# Patient Record
Sex: FEMALE | Race: WHITE | NOT HISPANIC OR LATINO | Employment: UNEMPLOYED | URBAN - METROPOLITAN AREA
[De-identification: names, ages, dates, MRNs, and addresses within clinical notes are randomized per-mention and may not be internally consistent; named-entity substitution may affect disease eponyms.]

---

## 2023-11-17 ENCOUNTER — TELEPHONE (OUTPATIENT)
Dept: CARDIOLOGY | Facility: CLINIC | Age: 78
End: 2023-11-17
Payer: MEDICARE

## 2023-11-17 NOTE — TELEPHONE ENCOUNTER
Pt feels tights in her chest and bp is running 150/80 this morning. Pt keeps saying she is not well and needs her appt moved up with Dr Gomez . She has a appt on Nov 28

## 2023-11-19 ENCOUNTER — HOSPITAL ENCOUNTER (EMERGENCY)
Facility: HOSPITAL | Age: 78
Discharge: HOME | End: 2023-11-20
Payer: MEDICARE

## 2023-11-19 VITALS
HEIGHT: 62 IN | WEIGHT: 150 LBS | BODY MASS INDEX: 27.6 KG/M2 | DIASTOLIC BLOOD PRESSURE: 113 MMHG | SYSTOLIC BLOOD PRESSURE: 199 MMHG | RESPIRATION RATE: 20 BRPM | TEMPERATURE: 97.3 F | HEART RATE: 100 BPM | OXYGEN SATURATION: 96 %

## 2023-11-19 PROCEDURE — 4500999001 HC ED NO CHARGE

## 2023-11-19 PROCEDURE — 99281 EMR DPT VST MAYX REQ PHY/QHP: CPT

## 2023-11-19 ASSESSMENT — COLUMBIA-SUICIDE SEVERITY RATING SCALE - C-SSRS
1. IN THE PAST MONTH, HAVE YOU WISHED YOU WERE DEAD OR WISHED YOU COULD GO TO SLEEP AND NOT WAKE UP?: NO
2. HAVE YOU ACTUALLY HAD ANY THOUGHTS OF KILLING YOURSELF?: NO
6. HAVE YOU EVER DONE ANYTHING, STARTED TO DO ANYTHING, OR PREPARED TO DO ANYTHING TO END YOUR LIFE?: NO

## 2023-11-19 ASSESSMENT — PAIN SCALES - GENERAL: PAINLEVEL_OUTOF10: 7

## 2023-11-19 ASSESSMENT — PAIN - FUNCTIONAL ASSESSMENT: PAIN_FUNCTIONAL_ASSESSMENT: 0-10

## 2023-11-20 ENCOUNTER — OFFICE VISIT (OUTPATIENT)
Dept: CARDIOLOGY | Facility: CLINIC | Age: 78
End: 2023-11-20
Payer: MEDICARE

## 2023-11-20 VITALS
OXYGEN SATURATION: 97 % | HEART RATE: 88 BPM | WEIGHT: 151 LBS | SYSTOLIC BLOOD PRESSURE: 142 MMHG | DIASTOLIC BLOOD PRESSURE: 84 MMHG | HEIGHT: 62 IN | BODY MASS INDEX: 27.79 KG/M2

## 2023-11-20 DIAGNOSIS — E78.49 OTHER HYPERLIPIDEMIA: ICD-10-CM

## 2023-11-20 DIAGNOSIS — I10 PRIMARY HYPERTENSION: Primary | ICD-10-CM

## 2023-11-20 DIAGNOSIS — R07.89 CHEST TIGHTNESS: ICD-10-CM

## 2023-11-20 DIAGNOSIS — I25.118 CORONARY ARTERY DISEASE OF NATIVE ARTERY OF NATIVE HEART WITH STABLE ANGINA PECTORIS (CMS-HCC): ICD-10-CM

## 2023-11-20 PROCEDURE — 3079F DIAST BP 80-89 MM HG: CPT | Performed by: INTERNAL MEDICINE

## 2023-11-20 PROCEDURE — 1125F AMNT PAIN NOTED PAIN PRSNT: CPT | Performed by: INTERNAL MEDICINE

## 2023-11-20 PROCEDURE — 99214 OFFICE O/P EST MOD 30 MIN: CPT | Performed by: INTERNAL MEDICINE

## 2023-11-20 PROCEDURE — 1159F MED LIST DOCD IN RCRD: CPT | Performed by: INTERNAL MEDICINE

## 2023-11-20 PROCEDURE — 3077F SYST BP >= 140 MM HG: CPT | Performed by: INTERNAL MEDICINE

## 2023-11-20 RX ORDER — LOSARTAN POTASSIUM 25 MG/1
25 TABLET ORAL
COMMUNITY
Start: 2023-09-12 | End: 2024-04-15 | Stop reason: SDUPTHER

## 2023-11-20 RX ORDER — METOPROLOL SUCCINATE 25 MG/1
25 TABLET, EXTENDED RELEASE ORAL DAILY
Qty: 30 TABLET | Refills: 11 | Status: SHIPPED | OUTPATIENT
Start: 2023-11-20 | End: 2024-01-24 | Stop reason: SDUPTHER

## 2023-11-20 RX ORDER — REGADENOSON 0.08 MG/ML
0.4 INJECTION, SOLUTION INTRAVENOUS
Status: CANCELLED | OUTPATIENT
Start: 2023-11-20

## 2023-11-20 RX ORDER — CALCIUM CITRATE/VITAMIN D3 315MG-6.25
1 TABLET ORAL
COMMUNITY
Start: 2022-12-13

## 2023-11-20 ASSESSMENT — ENCOUNTER SYMPTOMS
DYSPNEA ON EXERTION: 1
LIGHT-HEADEDNESS: 1
WEAKNESS: 1

## 2023-11-20 NOTE — PATIENT INSTRUCTIONS
Continue the losartan 25 mg daily    Start metoprolol Succinate 25 mg 1/2 tablet daily.    We are going to arrange for you to have a scan of your heart.    See Dr. Laurel Crowe.

## 2023-11-20 NOTE — PROGRESS NOTES
Subjective   Edelmira Montesinos is a 78 y.o. female.    Chief Complaint:  Chest tightness.  Follow-up hypertension.    HPI    She has developed chest tightness.  She describes this as a tight squeezing sensation in the midsternal area lasting for minutes to perhaps as long as 30 minutes.  Not associated with any other symptoms such as shortness of breath or diaphoresis.  Not necessarily related to activities.  However symptoms of chest tightness is new in comparison to prior evaluations.  She is checking her blood pressures very frequently at home.  She is concerned about elevated blood pressures and about low blood pressures.  She takes only losartan 25 mg daily.  She claims to have had multiple side effects to numerous antihypertensive medications that we have tried in the past.    Her diagnosis of coronary artery disease is based on findings of calcifications in the distribution of the coronary arteries seen on a prior CT scan of the chest.     Her past cardiac history is unremarkable. There is no history of diabetes. There is no smoking history. No family history of premature coronary artery disease.     She has had a number of side effects to multiple medications.     She has been seen by the neurology service. It was felt that she may have subtle changes of Parkinson's.     Other medical problems include a history of varicose veins and degenerative arthritis.    Allergies  Medication    · bisoprolol   Recorded By: Sera Crawford; 6/15/2020 8:49:56 AM   · metoprolol   Recorded By: Sera Crawford; 6/15/2020 8:49:56 AM   · spironolactone   Recorded By: Sera Crawford; 2020 12:07:52 PM   · telmisartan   Recorded By: Sera Crawford; 8/10/2020 10:09:16 AM     Family History  Mother    · Family history of    · Family history of Old age  Father    · Family history of cerebral infarction (V17.1) (Z82.3)     Social History  Problems    · Daily caffeine consumption   · Never a smoker   · No alcohol  "use      Review of Systems   Constitutional: Positive for malaise/fatigue.   Cardiovascular:  Positive for chest pain and dyspnea on exertion.   Musculoskeletal:  Positive for arthritis.   Neurological:  Positive for light-headedness and weakness.       Visit Vitals  /84 (BP Location: Right arm, Patient Position: Sitting, BP Cuff Size: Adult)   Pulse 88   Ht 1.575 m (5' 2\")   Wt 68.5 kg (151 lb)   SpO2 97%   BMI 27.62 kg/m²   BSA 1.73 m²        Objective     Constitutional:       Appearance: Not in distress.   Neck:      Vascular: JVD normal.   Pulmonary:      Breath sounds: Normal breath sounds.   Cardiovascular:      Normal rate. Regular rhythm. Normal S1. Normal S2.       Murmurs: There is no murmur.      No gallop.    Pulses:     Intact distal pulses.   Edema:     Peripheral edema absent.   Abdominal:      General: There is no distension.      Palpations: Abdomen is soft.   Neurological:      Mental Status: Alert.         Lab Review:   Lab Results   Component Value Date     02/07/2021    K 4.0 02/07/2021     02/07/2021    CO2 26 02/07/2021    BUN 17 02/07/2021    CREATININE 0.76 02/07/2021    GLUCOSE 94 02/07/2021    CALCIUM 9.1 02/07/2021     Lab Results   Component Value Date    CHOL 214 (H) 12/29/2020    TRIG 140 12/29/2020    HDL 67.9 12/29/2020       Assessment:    1.  Coronary artery disease.  Now has developed symptoms consistent with angina.  Our plan is to proceed with a Lexiscan thallium study.    2.  Hypertension.  Not well controlled.  Slightly increased heart rates on today's visit.  We will start metoprolol ER 25 mg daily.  She has been on this in the past but we had a discussion with her this is a long-acting medication.  We have strongly encouraged her not to check her blood pressures at home as this causes her a great deal of anxiety.    3.  Hyperlipidemia.  Not on statin therapy because of perceived side effects to medications.    4.  Movement disorder.  Suspicion of early " Parkinson's has been raised.  We have asked her to make an appointment with the neurology service again.

## 2023-11-20 NOTE — ED TRIAGE NOTES
Chest pain. Heaviness on chest, tightness on chest.  Has had intermittently over the past 3 months.  Has appointment with dr pierce tomorrow for this.

## 2023-11-21 PROBLEM — M85.80 OSTEOPENIA: Status: ACTIVE | Noted: 2023-11-21

## 2023-11-21 PROBLEM — E87.1 HYPONATREMIA: Status: ACTIVE | Noted: 2023-11-21

## 2023-11-21 PROBLEM — I47.10 PSVT (PAROXYSMAL SUPRAVENTRICULAR TACHYCARDIA) (CMS-HCC): Status: ACTIVE | Noted: 2023-11-21

## 2023-11-21 PROBLEM — E04.1 THYROID NODULE: Status: ACTIVE | Noted: 2023-11-15

## 2023-11-21 PROBLEM — R51.9 FREQUENT HEADACHES: Status: ACTIVE | Noted: 2020-10-21

## 2023-11-21 PROBLEM — G20.C PARKINSONISM (MULTI): Status: ACTIVE | Noted: 2023-11-21

## 2023-11-21 PROBLEM — R05.3 COUGH, PERSISTENT: Status: ACTIVE | Noted: 2023-11-21

## 2023-11-21 PROBLEM — I10 HYPERTENSION: Status: ACTIVE | Noted: 2020-10-21

## 2023-11-21 PROBLEM — R07.0 THROAT DISCOMFORT: Status: ACTIVE | Noted: 2023-11-21

## 2023-11-21 PROBLEM — I83.11 VARICOSE VEINS OF BOTH LOWER EXTREMITIES WITH INFLAMMATION: Status: ACTIVE | Noted: 2017-08-12

## 2023-11-21 PROBLEM — N81.2 CYSTOCELE WITH INCOMPLETE UTEROVAGINAL PROLAPSE: Status: ACTIVE | Noted: 2023-11-21

## 2023-11-21 PROBLEM — K21.9 CHRONIC GERD: Status: ACTIVE | Noted: 2023-11-21

## 2023-11-21 PROBLEM — R00.2 PALPITATIONS: Status: ACTIVE | Noted: 2023-11-21

## 2023-11-21 PROBLEM — M17.9 OSTEOARTHRITIS OF KNEE: Status: ACTIVE | Noted: 2023-11-21

## 2023-11-21 PROBLEM — M79.669 LOWER LEG PAIN: Status: ACTIVE | Noted: 2023-11-21

## 2023-11-21 PROBLEM — T50.905A MEDICATION ADVERSE EFFECT: Status: ACTIVE | Noted: 2023-11-21

## 2023-11-21 PROBLEM — L30.9 DERMATITIS: Status: ACTIVE | Noted: 2023-11-21

## 2023-11-21 PROBLEM — E66.3 OVERWEIGHT: Status: ACTIVE | Noted: 2023-11-21

## 2023-11-21 PROBLEM — R92.8 ABNORMAL MAMMOGRAM: Status: ACTIVE | Noted: 2023-11-21

## 2023-11-21 PROBLEM — R10.2 PELVIC PAIN IN FEMALE: Status: ACTIVE | Noted: 2023-11-21

## 2023-11-21 PROBLEM — R82.90 ABNORMAL URINALYSIS: Status: ACTIVE | Noted: 2023-11-21

## 2023-11-21 PROBLEM — N95.0 POST-MENOPAUSAL BLEEDING: Status: ACTIVE | Noted: 2023-11-21

## 2023-11-21 PROBLEM — I83.93 VARICOSE VEINS OF BOTH LOWER EXTREMITIES: Status: ACTIVE | Noted: 2017-08-12

## 2023-11-21 PROBLEM — R12 HEARTBURN: Status: ACTIVE | Noted: 2023-11-21

## 2023-11-21 PROBLEM — J04.0 REFLUX LARYNGITIS: Status: ACTIVE | Noted: 2023-11-21

## 2023-11-21 PROBLEM — K11.7 DISTURBANCE OF SALIVARY SECRETION: Status: ACTIVE | Noted: 2023-11-21

## 2023-11-21 PROBLEM — R42 DIZZINESS: Status: ACTIVE | Noted: 2023-11-21

## 2023-11-21 PROBLEM — M79.2 NERVE PAIN: Status: ACTIVE | Noted: 2023-11-21

## 2023-11-21 PROBLEM — I99.8 VASCULAR INSUFFICIENCY: Status: ACTIVE | Noted: 2023-11-21

## 2023-11-21 PROBLEM — B35.1 OM (ONYCHOMYCOSIS): Status: ACTIVE | Noted: 2017-08-12

## 2023-11-21 PROBLEM — G57.30 PERONEAL NEUROPATHY: Status: ACTIVE | Noted: 2023-11-21

## 2023-11-21 PROBLEM — R09.82 POST-NASAL DRIP: Status: ACTIVE | Noted: 2023-11-21

## 2023-11-21 PROBLEM — E78.5 HYPERLIPIDEMIA: Status: ACTIVE | Noted: 2023-11-21

## 2023-11-21 PROBLEM — I49.3 FREQUENT PVCS: Status: ACTIVE | Noted: 2023-11-21

## 2023-11-21 PROBLEM — K21.9 REFLUX LARYNGITIS: Status: ACTIVE | Noted: 2023-11-21

## 2023-11-21 PROBLEM — R45.0 NERVOUSNESS: Status: ACTIVE | Noted: 2023-11-21

## 2023-11-21 PROBLEM — E04.2 MULTIPLE THYROID NODULES: Status: ACTIVE | Noted: 2021-07-29

## 2023-11-21 PROBLEM — I83.12 VARICOSE VEINS OF BOTH LOWER EXTREMITIES WITH INFLAMMATION: Status: ACTIVE | Noted: 2017-08-12

## 2023-11-21 PROBLEM — R94.31 ABNORMAL EKG: Status: ACTIVE | Noted: 2023-11-21

## 2023-11-21 PROBLEM — R49.0 HOARSENESS, PERSISTENT: Status: ACTIVE | Noted: 2023-11-21

## 2023-12-03 ENCOUNTER — OFFICE VISIT (OUTPATIENT)
Dept: URGENT CARE | Facility: CLINIC | Age: 78
End: 2023-12-03
Payer: MEDICARE

## 2023-12-03 VITALS
SYSTOLIC BLOOD PRESSURE: 150 MMHG | DIASTOLIC BLOOD PRESSURE: 76 MMHG | RESPIRATION RATE: 20 BRPM | HEART RATE: 84 BPM | TEMPERATURE: 97.9 F | OXYGEN SATURATION: 97 %

## 2023-12-03 DIAGNOSIS — J20.9 ACUTE BRONCHITIS, UNSPECIFIED ORGANISM: Primary | ICD-10-CM

## 2023-12-03 PROCEDURE — 1159F MED LIST DOCD IN RCRD: CPT | Performed by: PHYSICIAN ASSISTANT

## 2023-12-03 PROCEDURE — 99203 OFFICE O/P NEW LOW 30 MIN: CPT | Performed by: PHYSICIAN ASSISTANT

## 2023-12-03 PROCEDURE — 3078F DIAST BP <80 MM HG: CPT | Performed by: PHYSICIAN ASSISTANT

## 2023-12-03 PROCEDURE — 3077F SYST BP >= 140 MM HG: CPT | Performed by: PHYSICIAN ASSISTANT

## 2023-12-03 PROCEDURE — 1126F AMNT PAIN NOTED NONE PRSNT: CPT | Performed by: PHYSICIAN ASSISTANT

## 2023-12-03 RX ORDER — BENZONATATE 100 MG/1
100 CAPSULE ORAL 3 TIMES DAILY PRN
Qty: 30 CAPSULE | Refills: 0 | Status: SHIPPED | OUTPATIENT
Start: 2023-12-03 | End: 2024-12-02

## 2023-12-03 RX ORDER — AZITHROMYCIN 250 MG/1
TABLET, FILM COATED ORAL
Qty: 6 TABLET | Refills: 0 | Status: SHIPPED | OUTPATIENT
Start: 2023-12-03

## 2023-12-03 ASSESSMENT — ENCOUNTER SYMPTOMS: COUGH: 1

## 2023-12-03 ASSESSMENT — PAIN SCALES - GENERAL: PAINLEVEL: 0-NO PAIN

## 2023-12-03 NOTE — PROGRESS NOTES
Subjective   Patient ID: Edelmira Montesinos is a 78 y.o. female.    Patient is an 78-year-old female who complains of worsening cough and congestion that she has been experiencing for the past 1 week.  Patient states that her cough is becoming increasingly productive and she is experiencing chest tightness and burning with forceful cough.  Patient has no history of asthma or COPD and does not smoke.  Patient denies significant congestion, sinus pressure, ear pain or sore throat.  Patient reports no fever, chills or myalgia.      Cough    The following portions of the chart were reviewed this encounter and updated as appropriate:       Review of Systems   Respiratory:  Positive for cough.    All other systems reviewed and are negative.    Objective   Physical Exam  Vitals and nursing note reviewed.   Constitutional:       Appearance: Normal appearance. She is normal weight.   HENT:      Head: Normocephalic and atraumatic.      Right Ear: Tympanic membrane, ear canal and external ear normal.      Left Ear: Tympanic membrane, ear canal and external ear normal.      Nose: Nose normal. No congestion or rhinorrhea.      Mouth/Throat:      Mouth: Mucous membranes are moist.      Pharynx: Oropharynx is clear. No oropharyngeal exudate or posterior oropharyngeal erythema.   Eyes:      Extraocular Movements: Extraocular movements intact.      Conjunctiva/sclera: Conjunctivae normal.      Pupils: Pupils are equal, round, and reactive to light.   Cardiovascular:      Rate and Rhythm: Normal rate and regular rhythm.      Pulses: Normal pulses.      Heart sounds: Normal heart sounds.   Pulmonary:      Effort: Pulmonary effort is normal. No respiratory distress.      Breath sounds: Normal breath sounds. No stridor. No wheezing, rhonchi or rales.   Musculoskeletal:      Cervical back: Normal range of motion and neck supple.   Skin:     General: Skin is warm and dry.      Capillary Refill: Capillary refill takes less than 2 seconds.    Neurological:      General: No focal deficit present.      Mental Status: She is alert and oriented to person, place, and time.   Psychiatric:         Mood and Affect: Mood normal.         Behavior: Behavior normal.         Thought Content: Thought content normal.         Judgment: Judgment normal.     Assessment/Plan   Physical exam findings as noted above.  Patient was provided with prescriptions for Zithromax 250 mg and Tessalon 100 mg.  Supportive care instructions were discussed and the patient verbalizes good understanding of same.    CLINCIAL IMPRESSION:  Acute Bronchitis    Diagnoses and all orders for this visit:  Acute bronchitis, unspecified organism  -     azithromycin (Zithromax) 250 mg tablet; Take 2 tablets (500 mg) on  Day 1,  followed by 1 tablet (250 mg) once daily on Days 2 through 5.  -     benzonatate (Tessalon Perles) 100 mg capsule; Take 1 capsule (100 mg) by mouth 3 times a day as needed for cough.

## 2024-01-17 PROBLEM — Z90.710 S/P VAGINAL HYSTERECTOMY: Status: ACTIVE | Noted: 2023-11-27

## 2024-01-19 ENCOUNTER — TELEPHONE (OUTPATIENT)
Dept: CARDIOLOGY | Facility: HOSPITAL | Age: 79
End: 2024-01-19
Payer: MEDICARE

## 2024-01-24 ENCOUNTER — APPOINTMENT (OUTPATIENT)
Dept: RADIOLOGY | Facility: HOSPITAL | Age: 79
End: 2024-01-24
Payer: MEDICARE

## 2024-01-24 ENCOUNTER — APPOINTMENT (OUTPATIENT)
Dept: CARDIOLOGY | Facility: HOSPITAL | Age: 79
End: 2024-01-24
Payer: MEDICARE

## 2024-01-24 ENCOUNTER — OFFICE VISIT (OUTPATIENT)
Dept: CARDIOLOGY | Facility: CLINIC | Age: 79
End: 2024-01-24
Payer: MEDICARE

## 2024-01-24 VITALS
SYSTOLIC BLOOD PRESSURE: 136 MMHG | HEIGHT: 62 IN | DIASTOLIC BLOOD PRESSURE: 80 MMHG | HEART RATE: 82 BPM | WEIGHT: 147 LBS | OXYGEN SATURATION: 96 % | BODY MASS INDEX: 27.05 KG/M2

## 2024-01-24 DIAGNOSIS — I10 PRIMARY HYPERTENSION: ICD-10-CM

## 2024-01-24 PROCEDURE — 99213 OFFICE O/P EST LOW 20 MIN: CPT | Performed by: INTERNAL MEDICINE

## 2024-01-24 PROCEDURE — 1159F MED LIST DOCD IN RCRD: CPT | Performed by: INTERNAL MEDICINE

## 2024-01-24 PROCEDURE — 1036F TOBACCO NON-USER: CPT | Performed by: INTERNAL MEDICINE

## 2024-01-24 PROCEDURE — 3079F DIAST BP 80-89 MM HG: CPT | Performed by: INTERNAL MEDICINE

## 2024-01-24 PROCEDURE — 3075F SYST BP GE 130 - 139MM HG: CPT | Performed by: INTERNAL MEDICINE

## 2024-01-24 PROCEDURE — 1126F AMNT PAIN NOTED NONE PRSNT: CPT | Performed by: INTERNAL MEDICINE

## 2024-01-24 RX ORDER — ALBUTEROL SULFATE 90 UG/1
2 AEROSOL, METERED RESPIRATORY (INHALATION) EVERY 4 HOURS PRN
COMMUNITY

## 2024-01-24 RX ORDER — METOPROLOL SUCCINATE 25 MG/1
12.5 TABLET, EXTENDED RELEASE ORAL DAILY
Qty: 45 TABLET | Refills: 3 | Status: SHIPPED | OUTPATIENT
Start: 2024-01-24 | End: 2025-01-23

## 2024-01-24 ASSESSMENT — ENCOUNTER SYMPTOMS
WEAKNESS: 1
DYSPNEA ON EXERTION: 1

## 2024-01-24 NOTE — PROGRESS NOTES
"Subjective   Edelmira Montesinos is a 78 y.o. female.    Chief Complaint:  Follow-up for hypertension.    HPI    On her last visit we started metoprolol succinate 12.5 mg daily.  She is actually tolerating it fairly well.  Does not describe any significant side effects.  She is here for follow-up of her other testing.    Her diagnosis of coronary artery disease is based on findings of calcifications in the distribution of the coronary arteries seen on a prior CT scan of the chest.     Her past cardiac history is unremarkable. There is no history of diabetes. There is no smoking history. No family history of premature coronary artery disease.     She has had a number of side effects to multiple medications.     She has been seen by the neurology service. It was felt that she may have subtle changes of Parkinson's.     Other medical problems include a history of varicose veins and degenerative arthritis.    Allergies  Medication    · bisoprolol   Recorded By: Sera Crawford; 6/15/2020 8:49:56 AM   · metoprolol   Recorded By: Sera Crawford; 6/15/2020 8:49:56 AM   · spironolactone   Recorded By: Sera Crawford; 2020 12:07:52 PM   · telmisartan   Recorded By: Sera Crawford; 8/10/2020 10:09:16 AM     Family History  Mother    · Family history of    · Family history of Old age  Father    · Family history of cerebral infarction (V17.1) (Z82.3)     Social History  Problems    · Daily caffeine consumption   · Never a smoker   · No alcohol use      Review of Systems   Constitutional: Positive for malaise/fatigue.   Cardiovascular:  Positive for dyspnea on exertion.   Musculoskeletal:  Positive for arthritis and joint pain.   Neurological:  Positive for weakness.       Visit Vitals  /80 (BP Location: Right arm, Patient Position: Sitting, BP Cuff Size: Adult)   Pulse 82   Ht 1.575 m (5' 2\")   Wt 66.7 kg (147 lb)   SpO2 96%   BMI 26.89 kg/m²   Smoking Status Never   BSA 1.71 m²        Objective "     Constitutional:       Appearance: Not in distress.   Neck:      Vascular: JVD normal.   Pulmonary:      Breath sounds: Normal breath sounds.   Cardiovascular:      Normal rate. Regular rhythm. Normal S1. Normal S2.       Murmurs: There is a grade 1/6 systolic murmur.      No gallop.    Pulses:     Intact distal pulses.   Edema:     Peripheral edema absent.   Abdominal:      General: There is no distension.      Palpations: Abdomen is soft.   Neurological:      Mental Status: Alert.         Lab Review:   Lab Results   Component Value Date     02/07/2021    K 4.0 02/07/2021     02/07/2021    CO2 26 02/07/2021    BUN 17 02/07/2021    CREATININE 0.76 02/07/2021    GLUCOSE 94 02/07/2021    CALCIUM 9.1 02/07/2021     Lab Results   Component Value Date    CHOL 214 (H) 12/29/2020    TRIG 140 12/29/2020    HDL 67.9 12/29/2020       Assessment:    1.  Hypertension.  Blood pressures are improved.  Heart rates are in the 60s to 70s.  She did have an extensive workup for secondary causes of hypertension.  This workup was negative.  Her cortisol aldosterone renin levels were normal.  Metanephrines were also normal.  Will continue on current therapy.    2.  Coronary artery disease.  No anginal symptoms.  She did have some symptoms suggestive of angina.  We did schedule of a Lexiscan thallium but this fell through.  Since she is now asymptomatic I do not think that we need to proceed at this point.  Should she develop symptoms in the future she is to call us and we will schedule a Lexiscan thallium at our office.

## 2024-01-24 NOTE — PATIENT INSTRUCTIONS
We are going to cancel your nuclear study.    Continue your current medications.    If you develop chest pressure in the future, call us and we will arrange for you to have a heart scan at our office.

## 2024-01-29 ENCOUNTER — TELEPHONE (OUTPATIENT)
Dept: CARDIOLOGY | Facility: CLINIC | Age: 79
End: 2024-01-29
Payer: MEDICARE

## 2024-01-29 DIAGNOSIS — I10 PRIMARY HYPERTENSION: ICD-10-CM

## 2024-02-05 NOTE — TELEPHONE ENCOUNTER
Patient called in stating she feels better when she takes 1 whole tab of metoprolol and is asking if she can be prescribed one whole tablet instead of a half.  Please advise.

## 2024-02-05 NOTE — TELEPHONE ENCOUNTER
HR 80's-90's. Pt feels better taking Metoprolol Succinate 25mg 1 tablet versus 1/2 tablet daily.    Ok to renew Metoprolol succinate 25mg 1 tablet daily?     Please advise. Thanks.

## 2024-02-20 ENCOUNTER — HOSPITAL ENCOUNTER (OUTPATIENT)
Dept: CARDIOLOGY | Facility: CLINIC | Age: 79
Discharge: HOME | End: 2024-02-20
Payer: MEDICARE

## 2024-02-20 ENCOUNTER — HOSPITAL ENCOUNTER (OUTPATIENT)
Dept: RADIOLOGY | Facility: CLINIC | Age: 79
Discharge: HOME | End: 2024-02-20
Payer: MEDICARE

## 2024-02-20 ENCOUNTER — OFFICE VISIT (OUTPATIENT)
Dept: CARDIOLOGY | Facility: CLINIC | Age: 79
End: 2024-02-20
Payer: MEDICARE

## 2024-02-20 VITALS
SYSTOLIC BLOOD PRESSURE: 122 MMHG | HEART RATE: 76 BPM | OXYGEN SATURATION: 97 % | WEIGHT: 148 LBS | DIASTOLIC BLOOD PRESSURE: 80 MMHG | BODY MASS INDEX: 27.07 KG/M2

## 2024-02-20 DIAGNOSIS — R07.89 CHEST TIGHTNESS: ICD-10-CM

## 2024-02-20 DIAGNOSIS — I25.118 CORONARY ARTERY DISEASE OF NATIVE ARTERY OF NATIVE HEART WITH STABLE ANGINA PECTORIS (CMS-HCC): ICD-10-CM

## 2024-02-20 DIAGNOSIS — R94.31 ABNORMAL ELECTROCARDIOGRAM (ECG) (EKG): ICD-10-CM

## 2024-02-20 DIAGNOSIS — I47.10 PSVT (PAROXYSMAL SUPRAVENTRICULAR TACHYCARDIA) (CMS-HCC): ICD-10-CM

## 2024-02-20 DIAGNOSIS — R07.9 CHEST PAIN, UNSPECIFIED: ICD-10-CM

## 2024-02-20 DIAGNOSIS — I10 PRIMARY HYPERTENSION: ICD-10-CM

## 2024-02-20 DIAGNOSIS — Z90.710 S/P VAGINAL HYSTERECTOMY: ICD-10-CM

## 2024-02-20 DIAGNOSIS — I49.3 FREQUENT PVCS: ICD-10-CM

## 2024-02-20 DIAGNOSIS — R00.2 PALPITATIONS: ICD-10-CM

## 2024-02-20 DIAGNOSIS — R94.31 ABNORMAL EKG: ICD-10-CM

## 2024-02-20 DIAGNOSIS — I25.10 ATHEROSCLEROTIC HEART DISEASE OF NATIVE CORONARY ARTERY WITHOUT ANGINA PECTORIS: ICD-10-CM

## 2024-02-20 DIAGNOSIS — I10 HYPERTENSION: Primary | ICD-10-CM

## 2024-02-20 DIAGNOSIS — R07.89 CHEST TIGHTNESS: Primary | ICD-10-CM

## 2024-02-20 DIAGNOSIS — I83.93 ASYMPTOMATIC VARICOSE VEINS OF BOTH LOWER EXTREMITIES: ICD-10-CM

## 2024-02-20 DIAGNOSIS — E78.49 OTHER HYPERLIPIDEMIA: ICD-10-CM

## 2024-02-20 PROCEDURE — 93016 CV STRESS TEST SUPVJ ONLY: CPT | Performed by: INTERNAL MEDICINE

## 2024-02-20 PROCEDURE — 1159F MED LIST DOCD IN RCRD: CPT | Performed by: INTERNAL MEDICINE

## 2024-02-20 PROCEDURE — 3079F DIAST BP 80-89 MM HG: CPT | Performed by: INTERNAL MEDICINE

## 2024-02-20 PROCEDURE — 93017 CV STRESS TEST TRACING ONLY: CPT

## 2024-02-20 PROCEDURE — 3074F SYST BP LT 130 MM HG: CPT | Performed by: INTERNAL MEDICINE

## 2024-02-20 PROCEDURE — 2500000004 HC RX 250 GENERAL PHARMACY W/ HCPCS (ALT 636 FOR OP/ED): Performed by: INTERNAL MEDICINE

## 2024-02-20 PROCEDURE — 99213 OFFICE O/P EST LOW 20 MIN: CPT | Performed by: INTERNAL MEDICINE

## 2024-02-20 PROCEDURE — 78452 HT MUSCLE IMAGE SPECT MULT: CPT | Performed by: INTERNAL MEDICINE

## 2024-02-20 PROCEDURE — 1036F TOBACCO NON-USER: CPT | Performed by: INTERNAL MEDICINE

## 2024-02-20 PROCEDURE — 1126F AMNT PAIN NOTED NONE PRSNT: CPT | Performed by: INTERNAL MEDICINE

## 2024-02-20 PROCEDURE — 78452 HT MUSCLE IMAGE SPECT MULT: CPT

## 2024-02-20 RX ORDER — REGADENOSON 0.08 MG/ML
0.4 INJECTION, SOLUTION INTRAVENOUS
Status: COMPLETED | OUTPATIENT
Start: 2024-02-20 | End: 2024-02-20

## 2024-02-20 RX ADMIN — REGADENOSON 0.4 MG: 0.4 INJECTION INTRAVENOUS at 11:44

## 2024-02-20 ASSESSMENT — ENCOUNTER SYMPTOMS
DYSPNEA ON EXERTION: 1
DISTURBANCES IN COORDINATION: 1
WEAKNESS: 1

## 2024-02-20 NOTE — PROGRESS NOTES
Subjective   Edelmira Montesinos is a 78 y.o. female.    Chief Complaint:  Follow-up stress thallium study.    HPI    She called us with symptoms consistent with angina.  We elected to proceed with a stress thallium study to determine whether she has reversible areas of ischemia.  She describes some pressure and heaviness in the chest.  Also shortness of breath with exertion.  She continues to have problems with balance and uses a cane.  She feels that some of her symptomatology is due to medications.  However overall she is feeling much better on beta-blocker therapy and she is now taking 1 tablet of the metoprolol.    Her diagnosis of coronary artery disease is based on findings of calcifications in the distribution of the coronary arteries seen on a prior CT scan of the chest.     Her past cardiac history is unremarkable. There is no history of diabetes. There is no smoking history. No family history of premature coronary artery disease.     She has had a number of side effects to multiple medications.     She has been seen by the neurology service. It was felt that she may have subtle changes of Parkinson's.     Other medical problems include a history of varicose veins and degenerative arthritis.     Allergies  Medication    · bisoprolol   Recorded By: Sera Crawford; 6/15/2020 8:49:56 AM   · metoprolol   Recorded By: Sera Crawford; 6/15/2020 8:49:56 AM   · spironolactone   Recorded By: Sera Crawford; 2020 12:07:52 PM   · telmisartan   Recorded By: Sera Crawford; 8/10/2020 10:09:16 AM     Family History  Mother    · Family history of    · Family history of Old age  Father    · Family history of cerebral infarction (V17.1) (Z82.3)     Social History  Problems    · Daily caffeine consumption   · Never a smoker   · No alcohol use        Review of Systems   Constitutional: Positive for malaise/fatigue.   Cardiovascular:  Positive for dyspnea on exertion.   Musculoskeletal:  Positive for  arthritis and joint pain.   Neurological:  Positive for weakness.     Review of Systems   Constitutional: Positive for malaise/fatigue.   Cardiovascular:  Positive for chest pain and dyspnea on exertion.   Neurological:  Positive for disturbances in coordination and weakness.   All other systems reviewed and are negative.      Visit Vitals  Smoking Status Never        Objective     Constitutional:       Appearance: Not in distress.   Neck:      Vascular: JVD normal.   Pulmonary:      Breath sounds: Normal breath sounds.   Cardiovascular:      Normal rate. Regular rhythm. Normal S1. Normal S2.       Murmurs: There is a grade 1/6 systolic murmur.      No gallop.    Pulses:     Intact distal pulses.   Edema:     Peripheral edema absent.   Abdominal:      General: There is no distension.      Palpations: Abdomen is soft.   Neurological:      Mental Status: Alert.         Lab Review:   Lab Results   Component Value Date     02/07/2021    K 4.0 02/07/2021     02/07/2021    CO2 26 02/07/2021    BUN 17 02/07/2021    CREATININE 0.76 02/07/2021    GLUCOSE 94 02/07/2021    CALCIUM 9.1 02/07/2021     Lab Results   Component Value Date    CHOL 214 (H) 12/29/2020    TRIG 140 12/29/2020    HDL 67.9 12/29/2020       Assessment:    1.  Coronary artery disease.  Today's stress thallium study is normal.  We personally reviewed the images.  She has a normal left ventricular ejection fraction with no areas of reversible ischemia.  Will continue medical management for her coronary disease.    2.  Hypertension.  Blood pressures overall are improved.    3.  Possible Parkinson's disease.  Does have a facial tremor and a hand tremor.  Some subtle changes suggestive of Parkinson's.  Does not want to see a neurologist again at this point.  We did discuss with her that her symptoms that she blames on medications are not due to medication

## 2024-02-21 ENCOUNTER — APPOINTMENT (OUTPATIENT)
Dept: CARDIOLOGY | Facility: CLINIC | Age: 79
End: 2024-02-21
Payer: MEDICARE

## 2024-02-29 ENCOUNTER — TELEPHONE (OUTPATIENT)
Dept: CARDIOLOGY | Facility: CLINIC | Age: 79
End: 2024-02-29
Payer: MEDICARE

## 2024-02-29 NOTE — TELEPHONE ENCOUNTER
Pt called 2/6/24 regarding Metoprolol Succinate dose. Per telephone encounter, pt was requesting to take 1 tablet of Metoprolol Succinate 25mg because she was taking 1/2 tablet and feels better with the full tablet. Dr. Gomez approved at that time. No refill sent at the time because pt informed office that she had enough tablets.    Called pt who states she would like to decrease Metoprolol back to 1/2 tablet of 25mg. Discussed telephone call from 2/6/24 and pt stated she was hoping the dose change was going to be an improvement.    Pt states no improvement with dose change. She c/o hot/cold feeling in legs from knees down and feet feel like they are falling asleep. Pt also c/o H/A's and increased. Pt does not check home BP and HR is 80's per pt.    Please advise. Thanks.

## 2024-02-29 NOTE — TELEPHONE ENCOUNTER
Reviewed message along with encounter on 2/6/24. Per Dr. Gomez, symptoms are not related to Metoprolol and pt should follow up with PCP. Pt should take Metoprolol 25mg 1 tablet daily to avoid elevated HR.    Called and notified pt but she will continue taking 1/2 tablet to see if symptoms improve. Pt verbalizes understanding of all instructions and information provided.

## 2024-02-29 NOTE — TELEPHONE ENCOUNTER
"Pt called said since she started taking 25mg of Metoprolol Succ  every day her \"left leg get hot and cold and falls asleep and also started getting headaches\".  Pt said she did not have any issues when taking 12.5mg  of Metoprolol Succ wants to know what Dr. Gomez want to do.  "

## 2024-03-05 ENCOUNTER — APPOINTMENT (OUTPATIENT)
Dept: RADIOLOGY | Facility: HOSPITAL | Age: 79
End: 2024-03-05
Payer: MEDICARE

## 2024-03-05 ENCOUNTER — APPOINTMENT (OUTPATIENT)
Dept: CARDIOLOGY | Facility: HOSPITAL | Age: 79
End: 2024-03-05
Payer: MEDICARE

## 2024-03-06 ENCOUNTER — TELEPHONE (OUTPATIENT)
Dept: CARDIOLOGY | Facility: CLINIC | Age: 79
End: 2024-03-06
Payer: MEDICARE

## 2024-03-06 NOTE — TELEPHONE ENCOUNTER
States she has been having Dizziness and confusion with her metoprolol    Was taking 1/2 tab and she felt fine  but PCP increased to 1 full tab daily (had UTI with HTN)    Also taking full tab feels like her heart is beating faster    Has not taken the medication this morning yet, should she?

## 2024-04-15 ENCOUNTER — TELEPHONE (OUTPATIENT)
Dept: CARDIOLOGY | Facility: CLINIC | Age: 79
End: 2024-04-15
Payer: MEDICARE

## 2024-04-15 DIAGNOSIS — I10 PRIMARY HYPERTENSION: Primary | ICD-10-CM

## 2024-04-15 RX ORDER — LOSARTAN POTASSIUM 25 MG/1
12.5 TABLET ORAL DAILY
COMMUNITY
Start: 2024-04-15 | End: 2024-04-23 | Stop reason: SDUPTHER

## 2024-04-15 NOTE — TELEPHONE ENCOUNTER
Per Dr. Gomez, called pt and notified to try decreasing Losartan 25mg to 1/2 tablet daily and continue Metoprolol Succinate 25mg 1/2 tablet daily. Instructed pt to monitor home BP's once a day and call me next week with an update. Pt agreeable and verbalizes understanding of all instructions. Med list updated.

## 2024-04-15 NOTE — TELEPHONE ENCOUNTER
Called pt who states she is taking Metoprolol Succinate 25mg 1/2 tab daily (doing well on 1/2 tablet) and Losartan 25mg 1 tablet daily.    Pt previously took Losartan 25mg 1/2 tablet daily for approx 3 years then this was increased to 1 tablet daily. Per pt, since Losartan was increased she has noticed the following side effects: back pain, legs numb, voice is hoarse, H/A after waking up, head and legs itchy. Pt feels this is due to Losartan 25mg 1 tablet daily - she did not have these side effects on 1/2 tablet.    Home BP's: 140-150/70's.    Pt intolerant to Spironolactone, Telmsartan, Bisoprolol in the past.    Please advise. Thanks.

## 2024-04-15 NOTE — TELEPHONE ENCOUNTER
Patient has been taking losartan for 3 years. She states when she wasn't feeling right, she would cut it in half but since adding Metoprolol, she's been taking the full 25 mg of Losartan. She feels that this isn't agreeing with her. When she wakes up whether it's in the middle of the night or in the morning, she feels itchy. Wants to know what she should do.

## 2024-04-22 NOTE — TELEPHONE ENCOUNTER
Called pt to discuss. Per pt, she resumed Losartan 25mg 1 tablet instead of 1/2 tablet daily on Saturday due to elevated BP. Pt continues to take Metoprolol 25m 1/2 tablet daily.    Pt taking Losartan 25mg 1 tablet in the morning and Metoprolol 25mg 1/2 tablet in the morning. Per pt, BP's are higher at night.    Pt feels Losartan is causing her to feel like she is dragging and tired. Pt asking if Dr Gomez would want to change Losartan?    Pt will continue Losartan 1 tablet daily unless Dr. Gomez wants to adjust.    Please advise. Thanks.

## 2024-04-22 NOTE — TELEPHONE ENCOUNTER
"Pt called back with b/p readings.  On 4/16 pt took 1/2 of b/p medication as instructed.  4/16 - 125/66 hr 65  4/17 - 153/82 hr 64 am    132/68 hr 67 pm  4/18 - 155/79 hr 71 am   138/70 hr 67 pm  4/19 - 166/83 hr 67 am   Didn't take in the pm  4/20 - 137/77 hr 68 am   Didn't take in the pm  4/21 - didn't take   4/22 - 149/76 hr 65     On 4/19 pt felt her blood pressure was high so she took both losartan and Metoprolol at her usual dosage from then until this morning. Pt is c/o feeling like a \"zombie\". Feels like she is dragging.   "

## 2024-04-23 RX ORDER — LOSARTAN POTASSIUM 25 MG/1
25 TABLET ORAL DAILY
COMMUNITY
Start: 2024-04-23

## 2024-04-23 NOTE — TELEPHONE ENCOUNTER
Per Dr. Gomez, called pt and notified to continue taking Losartan 25mg 1 tablet daily because Losartan is not causing her symptoms. Pt verbalizes understanding. Medication list updated.

## 2024-06-03 ENCOUNTER — TELEPHONE (OUTPATIENT)
Dept: CARDIOLOGY | Facility: CLINIC | Age: 79
End: 2024-06-03
Payer: MEDICARE

## 2024-06-03 NOTE — TELEPHONE ENCOUNTER
She has been trying the meds as usual. Is the same. Losartan not enough substance. Needs an ok for Knee surgery on June 19th. [phone].

## 2024-06-03 NOTE — TELEPHONE ENCOUNTER
Called pt to discuss. Pt states home BP's 140's/70's, HR 70's. Pt concerned that cardiac medications are slowing her down. Pt taking Losartan 25mg daily and Toprol 25mg 1/2 tablet daily. Informed pt BP readings are acceptable range. Pt denies CP/SOB at this time. No cardiac complaints. Informed pt surgeon's office will need to fax a cardiac clearance request to Dr. Gomez to complete. Informed pt this is our policy with surgeries so that Dr. Gomez is aware of what procedure is being done along with type of anesthesia and date of surgery. Pt verbalizes understanding of all instructions and information provided.

## 2024-06-13 ENCOUNTER — EVALUATION (OUTPATIENT)
Dept: PHYSICAL THERAPY | Facility: CLINIC | Age: 79
End: 2024-06-13
Payer: MEDICARE

## 2024-06-13 DIAGNOSIS — M17.11 OSTEOARTHRITIS OF RIGHT KNEE: Primary | ICD-10-CM

## 2024-06-13 PROCEDURE — 97161 PT EVAL LOW COMPLEX 20 MIN: CPT | Mod: GP

## 2024-06-13 PROCEDURE — 97530 THERAPEUTIC ACTIVITIES: CPT | Mod: GP

## 2024-06-13 ASSESSMENT — ENCOUNTER SYMPTOMS
OCCASIONAL FEELINGS OF UNSTEADINESS: 0
LOSS OF SENSATION IN FEET: 0
DEPRESSION: 0

## 2024-06-13 ASSESSMENT — ACTIVITIES OF DAILY LIVING (ADL): ADL_ASSISTANCE: INDEPENDENT

## 2024-06-13 ASSESSMENT — PAIN - FUNCTIONAL ASSESSMENT: PAIN_FUNCTIONAL_ASSESSMENT: 0-10

## 2024-06-13 ASSESSMENT — PAIN DESCRIPTION - DESCRIPTORS: DESCRIPTORS: PATIENT UNABLE TO DESCRIBE

## 2024-06-13 ASSESSMENT — PAIN SCALES - GENERAL: PAINLEVEL_OUTOF10: 6

## 2024-06-13 NOTE — PROGRESS NOTES
Physical Therapy Evaluation/DC     Patient Name: Edelmira Montesinos  MRN: [id number]  Today's Date: 6/13/2024  Time Calculation  Start Time: 0450  Stop Time: 0527  Time Calculation (min): 37 min  Billing:  Evaluation:   Evaluation:  (Low):  15  Treatment:   Therapeutic Exercise:  5  Therapeutic Activity:  17     Insurance  Visit #:  1    Insurance Reviewed (per information provided by  pre-cert team)  Authorization required:  No, Medicare: (POC signed 6/27/24)  Date range:  6/13/24 to 9/11/24     Assessment:  PT Assessment  PT Assessment Results: Decreased range of motion, Decreased strength, Decreased mobility, Pain  Rehab Prognosis: Fair  Evaluation/Treatment Tolerance: Patient tolerated treatment well   78yr F pt presents to physical therapy who has an upcoming R Total Knee Surgery scheduled on Jun 19th 2024. She is here for pre-op prior to her surgery. Able to educate patient on proper way to ascend/descend stairs post surgery;  WB status; and completed walker management training. During examination patient had decreased mobility, strength, gait/stair dysfunction, and decreased tolerance with activity. At this time patient was given printed handouts of proper exercises to complete after surgery as well as educated that she may complete these exercises prior to surgery within tolerance. Able to giver her the  Total Knee Exercise handout form as well. Pt left session amicable and well educated. There will be no follow up since patient is scheduled for surgery in the coming week.     Plan:  OP PT Plan  PT Plan: No Additional PT interventions required at this time  Certification Period Start Date: 06/13/24  Certification Period End Date: 09/11/24  Rehab Potential: Fair  Plan of Care Agreement: Patient    Current Problem:   Problem List Items Addressed This Visit             ICD-10-CM    Osteoarthritis of right knee - Primary M17.11     3/7/24: Knee XR: AP and lateral of the right knee, demonstrate she has a   varus  deformity bilaterally with medial joint disease on both knees, bone-on-bone. - per MD Eunice analysis     Subjective    General:  General  Reason for Referral: OA of R Knee  Referred By: MD Eunice  Past Medical History Relevant to Rehab: HTN  Preferred Learning Style: verbal, visual, written  General Comment: Is having knee surgery on June 19th and is here for pre-op. MD wanted her to recieve PT for how to walk in walker.  Precautions:  Precautions  STEADI Fall Risk Score (The score of 4 or more indicates an increased risk of falling): 4  Hearing/Visual Limitations: No hearing aides; glasses for reading  Pain:  Pain Assessment  Pain Assessment: 0-10  Pain Score: 6  Pain Type: Chronic pain  Pain Location: Knee  Pain Orientation: Right  Pain Descriptors: Patient unable to describe  Pain Frequency: Intermittent  Clinical Progression: Gradually worsening  Home Living:  Home Living  Home Living Comment: 1 story home with 2 steps to enter (HR); Basement with 12 steps (HR). Laundry is in basement. Walk-in shower;  Prior Level of Function:  Prior Function Per Pt/Caregiver Report  Level of [place]: Independent with ADLs and functional transfers, Independent with homemaking with ambulation  Receives Help From: Family  ADL Assistance: Independent  Homemaking Assistance: Independent  Ambulatory Assistance: Needs assistance  Transfers: Independent  Gait: Assistive device  Stairs: Railings  Vocational: Retired  Hand Dominance: Right  Prior Function Comments: (-) Driving would like to getback to driving after surgery    Objective   Functional Assessments:  Gait: The patient is ambulating with the following device: Ambulates with a cane and Walker training for post surgery. Gait deviations include: Lacks heel strike, Downward gaze, Forward flexed, and SLOW angelo, little to no knee flexion during swing phase.  Stair Negotiation: Ascends reciprocally and descends step too with the use of one rail.  Sit to Stand Transfers:  modified independent  Bed Mobility: independent and minimal assist for LE assist      Extremity/Trunk Assessments:  RLE   RLE : Exceptions to WFL  Strength RLE  R Hip Flexion: 4/5  R Hip ABduction: 4+/5  R Hip ADduction: 4+/5  R Knee Flexion: 4/5  R Knee Extension: 4/5  R Ankle Dorsiflexion: 4/5  R Ankle Plantar Flexion: 5/5    LLE   LLE : Exceptions to WFL  Strength LLE  L Hip Flexion: 4/5  L Hip ABduction: 4-/5  L Hip ADduction: 4/5  L Knee Flexion: 4/5  L Knee Extension: 4/5  L Ankle Dorsiflexion: 5/5  L Ankle Plantar Flexion: 5/5    KNEE  Functional Rating Scale  LEFS /80: 31/80  Observation  Observation Comment: Varus Deformity BL knees; BL knees moderate swellig  Knee Palpation/Joint Mobility  Palpation/Joint Mobility Comment: No pain at joint line BL; R kne pain at medial aspect  Knee AROM  R knee flexion: (140°): 100  L knee flexion: (140°): 90  R knee extension: (0°): 3  L knee extension: (0°): 0  Knee PROM  R knee flexion: (140°): 101  L knee flexion: (140°): 95  R knee extension: (0°): 1 (Paun with OP)  L knee extension: (0°): 0    Outcome Measures:  Other Measures  Lower Extremity Funtional Score (LEFS): 39%      Treatments:  Therapeutic Exercise:   Quad Sets x5 R LE   SLR x5 R LE   Heel Slides x5 with 5s holds     Therapeutic Activity  Therapeutic Activity Performed: Yes  Therapeutic Activity 1: Low Complex Eval Assessment: Stable, uncomplicated characteristics  1.. Functional mobility via AROM/PROM of LE; Verbal cues for sequencing/positioning.  2. Functional Performance via MMT of LE: Hip, knee, ankle; Verbal cues for sequencing/positioning.  3. Functional Performance via stairs: ascends/descends 4 6in steps in a step to step pattern with BL use of handrails   4. Educated pt on POC, goals of physical therapy, purpose of physical therapy, as well as the benefits in participating in physical therapy to increase functional mobility and maximize pt safety.    5. Walker Training: Educated the patient on  "proper way to use FWRW after TKA - able to perform with minimal to no cues  6. Stair Training: Educated patient on proper way to ascend/descend stairs post surgery: \"up with the good, down with the bad\"     EDUCATION:  Outpatient Education  Individual(s) Educated: Patient  Education Provided: Anatomy, Body Mechanics, Home Exercise Program, Fall Risk, Physiology, POC, Post-Op Precautions, Posture  Risk and Benefits Discussed with Patient/Caregiver/Other: yes  Patient/Caregiver Demonstrated Understanding: yes  Plan of Care Discussed and Agreed Upon: yes  Patient Response to Education: Patient/Caregiver Performed Return Demonstration of Exercises/Activities, Patient/Caregiver Asked Appropriate Questions, Patient/Caregiver Verbalized Understanding of Information  Access Code: [email or web address]  URL: [email or web address]  Date: 06/13/2024  Prepared by: Jennifer Carlos    Exercises  - Supine Heel Slide  - 1 x daily - 7 x weekly - 3 sets - 10 reps  - Supine Quad Set  - 1 x daily - 7 x weekly - 3 sets - 10 reps  - Supine Active Straight Leg Raise  - 1 x daily - 7 x weekly - 3 sets - 10 reps    Goals: No goals created since patient is requesting 1 visit due to her upcoming surgery in less than 1 week.     "

## 2024-06-13 NOTE — LETTER
June 13, 2024    Jordan Dawson MD  [address]  [place] [address]    Patient: Edelmira Montesinos   YOB: 1945   Date of Visit: 6/13/2024       Dear Jordan Dawson MD  [address]  [place] [address]    The attached plan of care is being sent to you because your patient’s medical reimbursement requires that you certify the plan of care. Your signature is required to allow uninterrupted insurance coverage.      You may indicate your approval by signing below and faxing this form back to us at Dept Fax: [phone].    Please call Dept: [phone] with any questions or concerns.    Thank you for this referral,        Jennifer Carlos, PT  [facility] [address]  [address] [address] [address]    Payer: Payor: MEDICARE / Plan: MEDICARE PART A AND B / Product Type: *No Product type* /                                                                         Date:     Dear Jennifer Carlos PT,     Re: Ms. Edelmira Montesinos, MRN:[id number]    I certify that I have reviewed the attached plan of care and it is medically necessary for Ms. Edelmira Montesinos (1945) who is under my care.          ______________________________________                    _________________  Provider name and credentials                                           Date and time                                                                                           Plan of Care 6/13/24   Effective from: 6/13/2024  Effective to: 9/11/2024    Plan ID: [id number]            Participants as of Finalize on 6/13/2024    Name Type Comments Contact Info    Jordan Dawson MD Referring Provider  [phone]    Jennifer Carlos PT Physical Therapist  [phone]       Last Plan Note     Author: Jennifer Carlos PT Status: Incomplete Last edited: 6/13/2024  4:45 PM       Physical Therapy Evaluation/DC     Patient Name: Edelmira Montesinos  MRN: [id number]  Today's Date: 6/13/2024  Time  Calculation  Start Time: 0450  Stop Time: 0527  Time Calculation (min): 37 min  Billing:  Evaluation:   Evaluation:  (Low):  15  Treatment:   Therapeutic Exercise:  5  Therapeutic Activity:  17     Insurance  Visit #:  1    Insurance Reviewed (per information provided by  pre-cert team)  Authorization required:  No, Medicare:   Date range:  6/13/24 to 9/11/24     Assessment:  PT Assessment  PT Assessment Results: Decreased range of motion, Decreased strength, Decreased mobility, Pain  Rehab Prognosis: Fair  Evaluation/Treatment Tolerance: Patient tolerated treatment well   78yr F pt presents to physical therapy who has an upcoming R Total Knee Surgery scheduled on Jun 19th 2024. She is here for pre-op prior to her surgery. Able to educate patient on proper way to ascend/descend stairs post surgery;  WB status; and completed walker management training. During examination patient had decreased mobility, strength, gait/stair dysfunction, and decreased tolerance with activity. At this time patient was given printed handouts of proper exercises to complete after surgery as well as educated that she may complete these exercises prior to surgery within tolerance. Able to giver her the  Total Knee Exercise handout form as well. Pt left session amicable and well educated. There will be no follow up since patient is scheduled for surgery in the coming week.     Plan:  OP PT Plan  PT Plan: No Additional PT interventions required at this time  Certification Period Start Date: 06/13/24  Certification Period End Date: 09/11/24  Rehab Potential: Fair  Plan of Care Agreement: Patient    Current Problem:   Problem List Items Addressed This Visit             ICD-10-CM    Osteoarthritis of right knee - Primary M17.11     3/7/24: Knee XR: AP and lateral of the right knee, demonstrate she has a   varus deformity bilaterally with medial joint disease on both knees, bone-on-bone. - per MD Eunice analysis     Subjective    General:  General  Reason for Referral: OA of R Knee  Referred By: MD Eunice  Past Medical History Relevant to Rehab: HTN  Preferred Learning Style: verbal, visual, written  General Comment: Is having knee surgery on June 19th and is here for pre-op. MD wanted her to recieve PT for how to walk in walker.  Precautions:  Precautions  STEADI Fall Risk Score (The score of 4 or more indicates an increased risk of falling): 4  Hearing/Visual Limitations: No hearing aides; glasses for reading  Pain:  Pain Assessment  Pain Assessment: 0-10  Pain Score: 6  Pain Type: Chronic pain  Pain Location: Knee  Pain Orientation: Right  Pain Descriptors: Patient unable to describe  Pain Frequency: Intermittent  Clinical Progression: Gradually worsening  Home Living:  Home Living  Home Living Comment: 1 story home with 2 steps to enter (HR); Basement with 12 steps (HR). Laundry is in basement. Walk-in shower;  Prior Level of Function:  Prior Function Per Pt/Caregiver Report  Level of [place]: Independent with ADLs and functional transfers, Independent with homemaking with ambulation  Receives Help From: Family  ADL Assistance: Independent  Homemaking Assistance: Independent  Ambulatory Assistance: Needs assistance  Transfers: Independent  Gait: Assistive device  Stairs: Railings  Vocational: Retired  Hand Dominance: Right  Prior Function Comments: (-) Driving would like to getback to driving after surgery    Objective  Functional Assessments:  Gait: The patient is ambulating with the following device: Ambulates with a cane and Walker training for post surgery. Gait deviations include: Lacks heel strike, Downward gaze, Forward flexed, and SLOW angelo, little to no knee flexion during swing phase.  Stair Negotiation: Ascends reciprocally and descends step too with the use of one rail.  Sit to Stand Transfers: modified independent  Bed Mobility: independent and minimal assist for LE assist      Extremity/Trunk Assessments:  RLE    RLE : Exceptions to WFL  Strength RLE  R Hip Flexion: 4/5  R Hip ABduction: 4+/5  R Hip ADduction: 4+/5  R Knee Flexion: 4/5  R Knee Extension: 4/5  R Ankle Dorsiflexion: 4/5  R Ankle Plantar Flexion: 5/5    LLE   LLE : Exceptions to WFL  Strength LLE  L Hip Flexion: 4/5  L Hip ABduction: 4-/5  L Hip ADduction: 4/5  L Knee Flexion: 4/5  L Knee Extension: 4/5  L Ankle Dorsiflexion: 5/5  L Ankle Plantar Flexion: 5/5    KNEE  Functional Rating Scale  LEFS /80: 31/80  Observation  Observation Comment: Varus Deformity BL knees; BL knees moderate swellig  Knee Palpation/Joint Mobility  Palpation/Joint Mobility Comment: No pain at joint line BL; R kne pain at medial aspect  Knee AROM  R knee flexion: (140°): 100  L knee flexion: (140°): 90  R knee extension: (0°): 3  L knee extension: (0°): 0  Knee PROM  R knee flexion: (140°): 101  L knee flexion: (140°): 95  R knee extension: (0°): 1 (Paun with OP)  L knee extension: (0°): 0    Outcome Measures:  Other Measures  Lower Extremity Funtional Score (LEFS): 39%      Treatments:  Therapeutic Exercise:   Quad Sets x5 R LE   SLR x5 R LE   Heel Slides x5 with 5s holds     Therapeutic Activity  Therapeutic Activity Performed: Yes  Therapeutic Activity 1: Low Complex Eval Assessment: Stable, uncomplicated characteristics  1.. Functional mobility via AROM/PROM of LE; Verbal cues for sequencing/positioning.  2. Functional Performance via MMT of LE: Hip, knee, ankle; Verbal cues for sequencing/positioning.  3. Functional Performance via stairs: ascends/descends 4 6in steps in a step to step pattern with BL use of handrails   4. Educated pt on POC, goals of physical therapy, purpose of physical therapy, as well as the benefits in participating in physical therapy to increase functional mobility and maximize pt safety.    5. Walker Training: Educated the patient on proper way to use FWRW after TKA - able to perform with minimal to no cues  6. Stair Training: Educated patient on proper  "way to ascend/descend stairs post surgery: \"up with the good, down with the bad\"     EDUCATION:  Outpatient Education  Individual(s) Educated: Patient  Education Provided: Anatomy, Body Mechanics, Home Exercise Program, Fall Risk, Physiology, POC, Post-Op Precautions, Posture  Risk and Benefits Discussed with Patient/Caregiver/Other: yes  Patient/Caregiver Demonstrated Understanding: yes  Plan of Care Discussed and Agreed Upon: yes  Patient Response to Education: Patient/Caregiver Performed Return Demonstration of Exercises/Activities, Patient/Caregiver Asked Appropriate Questions, Patient/Caregiver Verbalized Understanding of Information  Access Code: [email or web address]  URL: [email or web address]  Date: 06/13/2024  Prepared by: Jennifer Carlos    Exercises  - Supine Heel Slide  - 1 x daily - 7 x weekly - 3 sets - 10 reps  - Supine Quad Set  - 1 x daily - 7 x weekly - 3 sets - 10 reps  - Supine Active Straight Leg Raise  - 1 x daily - 7 x weekly - 3 sets - 10 reps    Goals: No goals created since patient is requesting 1 visit due to her upcoming surgery in less than 1 week.            Current Participants as of 6/13/2024    Name Type Comments Contact Info    Jordan Dawson MD Referring Provider  [phone]    Signature pending    Jennifer Carlos PT Physical Therapist  [phone]    Signature pending      "

## 2024-06-26 PROBLEM — R19.00 ABDOMINAL PULSATILE MASS: Status: ACTIVE | Noted: 2024-06-26

## 2024-06-26 PROBLEM — I83.11 VARICOSE VEINS OF BOTH LOWER EXTREMITIES WITH INFLAMMATION: Status: ACTIVE | Noted: 2017-08-12

## 2024-06-26 PROBLEM — I83.12 VARICOSE VEINS OF BOTH LOWER EXTREMITIES WITH INFLAMMATION: Status: ACTIVE | Noted: 2017-08-12

## 2024-06-26 PROBLEM — R03.0 FINDING OF ABOVE NORMAL BLOOD PRESSURE: Status: ACTIVE | Noted: 2024-06-26

## 2024-06-26 PROBLEM — N39.0 ACUTE URINARY TRACT INFECTION: Status: ACTIVE | Noted: 2024-06-26

## 2024-06-26 PROBLEM — M17.0 PRIMARY OSTEOARTHRITIS OF KNEES, BILATERAL: Status: ACTIVE | Noted: 2024-03-07

## 2024-07-15 ENCOUNTER — TELEPHONE (OUTPATIENT)
Dept: CARDIOLOGY | Facility: CLINIC | Age: 79
End: 2024-07-15
Payer: MEDICARE

## 2024-07-17 ENCOUNTER — APPOINTMENT (OUTPATIENT)
Dept: CARDIOLOGY | Facility: CLINIC | Age: 79
End: 2024-07-17
Payer: MEDICARE

## 2024-07-26 ENCOUNTER — EVALUATION (OUTPATIENT)
Dept: PHYSICAL THERAPY | Facility: CLINIC | Age: 79
End: 2024-07-26
Payer: MEDICARE

## 2024-07-26 DIAGNOSIS — Z96.651 PRESENCE OF RIGHT ARTIFICIAL KNEE JOINT: ICD-10-CM

## 2024-07-26 PROCEDURE — 97110 THERAPEUTIC EXERCISES: CPT | Mod: GP | Performed by: PHYSICAL THERAPIST

## 2024-07-26 PROCEDURE — 97161 PT EVAL LOW COMPLEX 20 MIN: CPT | Mod: GP | Performed by: PHYSICAL THERAPIST

## 2024-07-26 ASSESSMENT — PATIENT HEALTH QUESTIONNAIRE - PHQ9
1. LITTLE INTEREST OR PLEASURE IN DOING THINGS: NOT AT ALL
2. FEELING DOWN, DEPRESSED OR HOPELESS: NOT AT ALL
SUM OF ALL RESPONSES TO PHQ9 QUESTIONS 1 AND 2: 0

## 2024-07-26 ASSESSMENT — ENCOUNTER SYMPTOMS
OCCASIONAL FEELINGS OF UNSTEADINESS: 0
DEPRESSION: 0
LOSS OF SENSATION IN FEET: 0

## 2024-07-26 ASSESSMENT — COLUMBIA-SUICIDE SEVERITY RATING SCALE - C-SSRS
6. HAVE YOU EVER DONE ANYTHING, STARTED TO DO ANYTHING, OR PREPARED TO DO ANYTHING TO END YOUR LIFE?: NO
2. HAVE YOU ACTUALLY HAD ANY THOUGHTS OF KILLING YOURSELF?: NO
1. IN THE PAST MONTH, HAVE YOU WISHED YOU WERE DEAD OR WISHED YOU COULD GO TO SLEEP AND NOT WAKE UP?: NO

## 2024-07-26 NOTE — PROGRESS NOTES
Patient Name Edelmira Montesinos  MRN: [id number]  Today's Date: 7/26/2024  Time Calculation  Start Time: 0200  Stop Time: 0245  Time Calculation (min): 45 min    Insurance:   Visit #: 1  Insurance Reviewed  (per information provided by  pre-cert team)  [facility] certification date range:  7-26-24/10-23-24    Therapy Diagnoses:   Problem List Items Addressed This Visit             ICD-10-CM    Presence of right artificial knee joint Z96.651     General:  Reason for visit: s/p R TKR   Referred by: Jordan Shen MD appt:  none scheduled  Preferred Name:  Edelmira  Script:  PT  Onset Date:  6-19-24  Most recent assessment/re-assessment: 7-26-24  Email/phone #:  [email or web address]    Assessment:    Evolving with changing characteristics  Level of complexity:  low  Patient with recent TKR, needs work on/Skilled PT for ROM, flexibility, strength, balance, closed chain, gait and stair activities for improved overall function, signs and symptoms are consistent with diagnosis and patient is an excellent candidate for Physical Therapy.    Problems:    Pain:  __x_  Posture/Body mechanics deficits:  __x_  Decreased knowledge HEP:  __x_  Decreased knowledge of Precautions:  __x_  Activity Limitations:   _x__  ADL's/IADL's/Self-care skills:  __x_  ROM/Joint Mobility:  __x_  Strength:  _x__  Decreased functional level:   _x__  Flexibility:  _x__  Tenderness/decreased soft tissue mobility:  __x_  Gait/Stairs:  _x__  Fall Risk:  _x__  Balance:  _x__  Edema:  ___  Participation restrictions:  ___  Sensory:  ___  Transfers:  ___  Decreased knowledge of brace:   ___  Other:  ___    X Indicates included in problem list    Goals:    STG:  In two weeks.   -Increased postural awareness.   -Compliant with HEP  LTG: by discharge  -Increased postural awareness and posture WFL.  -Increased function with ADL's and IADL's.  Improve LEFS to:  20 %  limitation of function.  -Normal gait pattern  on level and uneven surfaces community level  "distances for improved function in the community.   -Normal reciprocal pattern on stairs for improved function to all levels of home.    -Increase  R SLS to 15\"  -Increase R knee AROM to 0/120 for improved function with car transfers.   -Increase R LE strength to WNL for improved function with chores and volunteer duties.  -Increase R LE flexibility to WFL.    -I and compliant with HEP and proper: R LE care.      Rehab potential to achieve the above goals is good.    Patient is aware of diagnosis and prognosis and agrees with established plan of care and goals.    Plan:   Skilled PT 2 x/week for 12 weeks( Until goals achieved, maximum rehab potential has been achieved, or patient has plateaued)  for:    Aquatics  _____  CP   __x__  Vasopneumatic device __x__  Dry needling  ____  Education  _x___  Electrical Stimulation  __x__  Gait training  __x__  HEP  __x__  Manual  ___x_  Mechanical Traction  ____  NMR  __x__  Self-care/home management  _x___  Therapeutic Exercise   ___x_  Therapeutic Activities  __x__  US  __x__  Work Conditioning  ____  Re-assessment  _x___  Other  ____    X Indicates included in treatment plan    PT for Nu-step for functional mobility and soft tissue warm up for more efficient stretching, work on ROM, flexibility, strength, balance, closed chain, gait and stair activities for improved overall function.  PROM to work on ROM and modalities prn.      Subjective:  HPI: Patient with severe R knee OA and history of R TKR on 6-19-24 and hospitalized for 3 days due to flare up of L knee pain which cortisone has helped.  Patient with [facility] until 7-18-24. Patient is referred to outpatient PT.  Patient had redness over incision at MD appointment this week and was put on antibiotics.  Compliant with supine HEP 3 x/day    Pain:  4-5/10  Type of pain:  post-op knee pain/discomfort  What increases pain: after exercise and walking.    What decreases pain:  rest, no longer using ice    Medical Hx/  Fall Risk:  " no  Steadi:  4  yes  Precautions: HTN, s// R TKR and L knee OA and will need L TKR.     Human Trafficking risk:  No    Patient goal:  Decreased pain and increased function.   Patient is aware of diagnosis and prognosis.    Living Environment:  ranch with basement laundry with rail/4 access steps with one rail.    Social Support:  lives with:  /son  DME:     ww/straight cane, RTS.     Prior Function:   has not driven for over one year due HTN issues, ambulation with cane prior to surgery.      Function:    A and O x 3  Sleep: up to the bathroom, supine, instructed in proper postures.  ADL's:  I with dressing and showering, using walker in the shower.    Chores:  some light chores, would like to resume yard work and gardening.    Driving: hopes to resume driving, has not for one year due to HTN issues.  Slow with transfers.    Work:  housewife.    Recreation: wants to resume activities around the house and with family.      Objective:    Outcome Measures:  Other Measures  Lower Extremity Funtional Score (LEFS): 30 53% limitation of function.     Posture:  decreased WB R LE.  Compression stockings R distal LE.      Gait/stairs:  decreased stance time, decreased hip ext and trunk rotation, min hesitation with R LE WB and step to pattern on stairs with B UE support with L LE WB.      Balance:   R SLS:  Unable    ROM:  R  knee ext/flex:  A: -16/85 after stretching:  A: -8 /AA:  91    MMT:   Hip flexors: 4-/5  ext: supine leg press:  4-/5  abd: 4-/5  add: 3-/5  Quad lag: 15  Hams: supine:  4-/5  DF: 4/5  PF: (NWB):  4/5    Flexibility:    Hams:  90/90: -32  Heelcords: 0  Hip flexors:  mod/sev    Palpation:  mod LE edema and scabbing over incision and some redness noted.      Treatment:    Evaluation:  30 minutes    **= HEP  NV= Next visit  np= not performed  nb= non-billable  G= group  HEP= discharged to [facility].   **Issue standing HEP NV**  Therapeutic Exercise:  10  Nu-step(subjective taken/education):    NV  Stair R   "knee flex/ext stretch:  NV  R hip flexor/calf stretch:  NV    LAQ:  NV  seated hams curl with t-band: NV    HS with belt:  10/10\"**  QS with towel roll under heel and knee:  15/5\"**  SAQ:  NV    NMR:     R SLS:  NV  R T-band TKE:  NV  Partial squats:  NV    R knee ROM:  A: -8 /AA: 91 after stretching    Modalities:    CP to R knee x 10' supine with bolster.     Self Care Home Management:    minutes  Education:  poc, anatomy, physiology, posture, body mechanics, safety awareness, HEP.  Preferred learning:  pictures, demonstration.  Demonstrated good understanding.                                      "

## 2024-07-26 NOTE — LETTER
July 26, 2024    Jordan Dawson MD  [address]  [place] [address]    Patient: Edelmira Montesinos   YOB: 1945   Date of Visit: 7/26/2024       Dear Jordan Dawson MD  [address]  [place] [address]    The attached plan of care is being sent to you because your patient’s medical reimbursement requires that you certify the plan of care. Your signature is required to allow uninterrupted insurance coverage.      You may indicate your approval by signing below and faxing this form back to us at Dept Fax: [phone].    Please call Dept: [phone] with any questions or concerns.    Thank you for this referral,        Inna Patel, PT  [facility] [address] [facility]  [address] [facility] [facility] [address]    Payer: Payor: MEDICARE / Plan: MEDICARE PART A AND B / Product Type: *No Product type* /                                                                         Date:     Dear Inna Patel, PT,     Re: Ms. Edelmira Montesinos, MRN:[id number]    I certify that I have reviewed the attached plan of care and it is medically necessary for Ms. Edelmira Montesinos (1945) who is under my care.          ______________________________________                    _________________  Provider name and credentials                                           Date and time                                                                                           Plan of Care 7/26/24   Effective from: 7/26/2024  Effective to: 10/23/2024    Plan ID: [id number]            Participants as of Finalize on 7/26/2024    Name Type Comments Contact Info    Jordan Dawson MD Referring Provider  [phone]    Inna Patel, PT Physical Therapist  [phone]       Last Plan Note     Author: Inna Patel PT Status: Incomplete Last edited: 7/26/2024  2:00 PM       Patient Name Edelmira Montesinos  MRN: [id number]  Today's Date: 7/26/2024  Time Calculation  Start Time:  0200  Stop Time: 0245  Time Calculation (min): 45 min    Insurance:   Visit #: 1  Insurance Reviewed  (per information provided by  pre-cert team)  [facility] certification date range:  7-26-24/10-23-24    Therapy Diagnoses:   Problem List Items Addressed This Visit             ICD-10-CM    Presence of right artificial knee joint Z96.651     General:  Reason for visit: s/p R TKR   Referred by: Jordan Shen MD appt:  none scheduled  Preferred Name:  Edelmira  Script:  PT  Onset Date:  6-19-24  Most recent assessment/re-assessment: 7-26-24  Email/phone #:  [email or web address]    Assessment:    Evolving with changing characteristics  Level of complexity:  low  Patient with recent TKR, needs work on/Skilled PT for ROM, flexibility, strength, balance, closed chain, gait and stair activities for improved overall function, signs and symptoms are consistent with diagnosis and patient is an excellent candidate for Physical Therapy.    Problems:    Pain:  __x_  Posture/Body mechanics deficits:  __x_  Decreased knowledge HEP:  __x_  Decreased knowledge of Precautions:  __x_  Activity Limitations:   _x__  ADL's/IADL's/Self-care skills:  __x_  ROM/Joint Mobility:  __x_  Strength:  _x__  Decreased functional level:   _x__  Flexibility:  _x__  Tenderness/decreased soft tissue mobility:  __x_  Gait/Stairs:  _x__  Fall Risk:  _x__  Balance:  _x__  Edema:  ___  Participation restrictions:  ___  Sensory:  ___  Transfers:  ___  Decreased knowledge of brace:   ___  Other:  ___    X Indicates included in problem list    Goals:    STG:  In two weeks.   -Increased postural awareness.   -Compliant with HEP  LTG: by discharge  -Increased postural awareness and posture WFL.  -Increased function with ADL's and IADL's.  Improve LEFS to:  20 %  limitation of function.  -Normal gait pattern  on level and uneven surfaces community level distances for improved function in the community.   -Normal reciprocal pattern on stairs for improved  "function to all levels of home.    -Increase  R SLS to 15\"  -Increase R knee AROM to 0/120 for improved function with car transfers.   -Increase R LE strength to WNL for improved function with chores and volunteer duties.  -Increase R LE flexibility to WFL.    -I and compliant with HEP and proper: R LE care.      Rehab potential to achieve the above goals is good.    Patient is aware of diagnosis and prognosis and agrees with established plan of care and goals.    Plan:   Skilled PT 2 x/week for 12 weeks( Until goals achieved, maximum rehab potential has been achieved, or patient has plateaued)  for:    Aquatics  _____  CP   __x__  Vasopneumatic device __x__  Dry needling  ____  Education  _x___  Electrical Stimulation  __x__  Gait training  __x__  HEP  __x__  Manual  ___x_  Mechanical Traction  ____  NMR  __x__  Self-care/home management  _x___  Therapeutic Exercise   ___x_  Therapeutic Activities  __x__  US  __x__  Work Conditioning  ____  Re-assessment  _x___  Other  ____    X Indicates included in treatment plan    PT for Nu-step for functional mobility and soft tissue warm up for more efficient stretching, work on ROM, flexibility, strength, balance, closed chain, gait and stair activities for improved overall function.  PROM to work on ROM and modalities prn.      Subjective:  HPI: Patient with severe R knee OA and history of R TKR on 6-19-24 and hospitalized for 3 days due to flare up of L knee pain which cortisone has helped.  Patient with [facility] until 7-18-24. Patient is referred to outpatient PT.  Patient had redness over incision at MD appointment this week and was put on antibiotics.  Compliant with supine HEP 3 x/day    Pain:  4-5/10  Type of pain:  post-op knee pain/discomfort  What increases pain: after exercise and walking.    What decreases pain:  rest, no longer using ice    Medical Hx/  Fall Risk:  no  Steadi:  4  yes  Precautions: HTN, s// R TKR and L knee OA and will need L TKR.     Human " Trafficking risk:  No    Patient goal:  Decreased pain and increased function.   Patient is aware of diagnosis and prognosis.    Living Environment:  ranch with basement laundry with rail/4 access steps with one rail.    Social Support:  lives with:  /son  DME:     ww/straight cane, RTS.     Prior Function:   has not driven for over one year due HTN issues, ambulation with cane prior to surgery.      Function:    A and O x 3  Sleep: up to the bathroom, supine, instructed in proper postures.  ADL's:  I with dressing and showering, using walker in the shower.    Chores:  some light chores, would like to resume yard work and gardening.    Driving: hopes to resume driving, has not for one year due to HTN issues.  Slow with transfers.    Work:  housewife.    Recreation: wants to resume activities around the house and with family.      Objective:    Outcome Measures:  Other Measures  Lower Extremity Funtional Score (LEFS): 30 53% limitation of function.     Posture:  decreased WB R LE.  Compression stockings R distal LE.      Gait/stairs:  decreased stance time, decreased hip ext and trunk rotation, min hesitation with R LE WB and step to pattern on stairs with B UE support with L LE WB.      Balance:   R SLS:  Unable    ROM:  R  knee ext/flex:  A: -16/85 after stretching:  A: -8 /AA:  91    MMT:   Hip flexors: 4-/5  ext: supine leg press:  4-/5  abd: 4-/5  add: 3-/5  Quad lag: 15  Hams: supine:  4-/5  DF: 4/5  PF: (NWB):  4/5    Flexibility:    Hams:  90/90: -32  Heelcords: 0  Hip flexors:  mod/sev    Palpation:  mod LE edema and scabbing over incision and some redness noted.      Treatment:    Evaluation:  30 minutes    **= HEP  NV= Next visit  np= not performed  nb= non-billable  G= group  HEP= discharged to [facility].   **Issue standing HEP NV**  Therapeutic Exercise:  10  Nu-step(subjective taken/education):    NV  Stair R  knee flex/ext stretch:  NV  R hip flexor/calf stretch:  NV    LAQ:  NV  seated hams curl with  "t-band: NV    HS with belt:  10/10\"**  QS with towel roll under heel and knee:  15/5\"**  SAQ:  NV    NMR:     R SLS:  NV  R T-band TKE:  NV  Partial squats:  NV    R knee ROM:  A: -8 /AA: 91 after stretching    Modalities:    CP to R knee x 10' supine with bolster.     Self Care Home Management:    minutes  Education:  poc, anatomy, physiology, posture, body mechanics, safety awareness, HEP.  Preferred learning:  pictures, demonstration.  Demonstrated good understanding.                                             Current Participants as of 7/26/2024    Name Type Comments Contact Info    Jordan Dawson MD Referring Provider  [phone]    Signature pending    Inna Patel PT Physical Therapist  [phone]    Signature pending      "

## 2024-07-26 NOTE — LETTER
August 13, 2024    Jordan Dawson MD  [address]  [place] [address]    Patient: Edelmira Montesinos   YOB: 1945   Date of Visit: 7/26/2024       Dear Jordan Dawson MD  [address]  [place] [address]    The attached plan of care is being sent to you because your patient’s medical reimbursement requires that you certify the plan of care. Your signature is required to allow uninterrupted insurance coverage.      You may indicate your approval by signing below and faxing this form back to us at Dept Fax: [phone].    Please call Dept: [phone] with any questions or concerns.    Thank you for this referral,        Inna Patel, PT  [facility] [place] [address] [facility]  [address] [facility] [address]    Payer: Payor: MEDICARE / Plan: MEDICARE PART A AND B / Product Type: *No Product type* /                                                                         Date:     Dear Inna Patel, PT,     Re: Ms. Edelmira Montesinos, MRN:[id number]    I certify that I have reviewed the attached plan of care and it is medically necessary for Ms. Edelmira Montesinos (1945) who is under my care.          ______________________________________                    _________________  Provider name and credentials                                           Date and time                                                                                           Plan of Care 7/26/24   Effective from: 7/26/2024  Effective to: 10/23/2024    Plan ID: [id number]                Participants as of Finalize on 7/26/2024      Name Type Comments Contact Info    Jordan Dawson MD Referring Provider  [phone]    Inna Patel, PT Physical Therapist  [phone]           Last Plan Note       Author: Inna Patel PT Status: Incomplete Last edited: 7/26/2024  2:00 PM         Patient Name Edelmira Montesinos  MRN: [id number]  Today's Date: 7/26/2024  Time  Calculation  Start Time: 0200  Stop Time: 0245  Time Calculation (min): 45 min    Insurance:   Visit #: 1  Insurance Reviewed  (per information provided by  pre-cert team)  [facility] certification date range:  7-26-24/10-23-24    Therapy Diagnoses:   Problem List Items Addressed This Visit             ICD-10-CM    Presence of right artificial knee joint Z96.651     General:  Reason for visit: s/p R TKR   Referred by: Jordan Shen MD appt:  none scheduled  Preferred Name:  Edelmira  Script:  PT  Onset Date:  6-19-24  Most recent assessment/re-assessment: 7-26-24  Email/phone #:  [email or web address]    Assessment:    Evolving with changing characteristics  Level of complexity:  low  Patient with recent TKR, needs work on/Skilled PT for ROM, flexibility, strength, balance, closed chain, gait and stair activities for improved overall function, signs and symptoms are consistent with diagnosis and patient is an excellent candidate for Physical Therapy.    Problems:    Pain:  __x_  Posture/Body mechanics deficits:  __x_  Decreased knowledge HEP:  __x_  Decreased knowledge of Precautions:  __x_  Activity Limitations:   _x__  ADL's/IADL's/Self-care skills:  __x_  ROM/Joint Mobility:  __x_  Strength:  _x__  Decreased functional level:   _x__  Flexibility:  _x__  Tenderness/decreased soft tissue mobility:  __x_  Gait/Stairs:  _x__  Fall Risk:  _x__  Balance:  _x__  Edema:  ___  Participation restrictions:  ___  Sensory:  ___  Transfers:  ___  Decreased knowledge of brace:   ___  Other:  ___    X Indicates included in problem list    Goals:    STG:  In two weeks.   -Increased postural awareness.   -Compliant with HEP  LTG: by discharge  -Increased postural awareness and posture WFL.  -Increased function with ADL's and IADL's.  Improve LEFS to:  20 %  limitation of function.  -Normal gait pattern  on level and uneven surfaces community level distances for improved function in the community.   -Normal reciprocal  "pattern on stairs for improved function to all levels of home.    -Increase  R SLS to 15\"  -Increase R knee AROM to 0/120 for improved function with car transfers.   -Increase R LE strength to WNL for improved function with chores and volunteer duties.  -Increase R LE flexibility to WFL.    -I and compliant with HEP and proper: R LE care.      Rehab potential to achieve the above goals is good.    Patient is aware of diagnosis and prognosis and agrees with established plan of care and goals.    Plan:   Skilled PT 2 x/week for 12 weeks( Until goals achieved, maximum rehab potential has been achieved, or patient has plateaued)  for:    Aquatics  _____  CP   __x__  Vasopneumatic device __x__  Dry needling  ____  Education  _x___  Electrical Stimulation  __x__  Gait training  __x__  HEP  __x__  Manual  ___x_  Mechanical Traction  ____  NMR  __x__  Self-care/home management  _x___  Therapeutic Exercise   ___x_  Therapeutic Activities  __x__  US  __x__  Work Conditioning  ____  Re-assessment  _x___  Other  ____    X Indicates included in treatment plan    PT for Nu-step for functional mobility and soft tissue warm up for more efficient stretching, work on ROM, flexibility, strength, balance, closed chain, gait and stair activities for improved overall function.  PROM to work on ROM and modalities prn.      Subjective:  HPI: Patient with severe R knee OA and history of R TKR on 6-19-24 and hospitalized for 3 days due to flare up of L knee pain which cortisone has helped.  Patient with [facility] until 7-18-24. Patient is referred to outpatient PT.  Patient had redness over incision at MD appointment this week and was put on antibiotics.  Compliant with supine HEP 3 x/day    Pain:  4-5/10  Type of pain:  post-op knee pain/discomfort  What increases pain: after exercise and walking.    What decreases pain:  rest, no longer using ice    Medical Hx/  Fall Risk:  no  Steadi:  4  yes  Precautions: HTN, s// R TKR and L knee OA and will " need L TKR.     Human Trafficking risk:  No    Patient goal:  Decreased pain and increased function.   Patient is aware of diagnosis and prognosis.    Living Environment:  ranch with basement laundry with rail/4 access steps with one rail.    Social Support:  lives with:  /son  DME:     ww/straight cane, RTS.     Prior Function:   has not driven for over one year due HTN issues, ambulation with cane prior to surgery.      Function:    A and O x 3  Sleep: up to the bathroom, supine, instructed in proper postures.  ADL's:  I with dressing and showering, using walker in the shower.    Chores:  some light chores, would like to resume yard work and gardening.    Driving: hopes to resume driving, has not for one year due to HTN issues.  Slow with transfers.    Work:  housewife.    Recreation: wants to resume activities around the house and with family.      Objective:    Outcome Measures:  Other Measures  Lower Extremity Funtional Score (LEFS): 30 53% limitation of function.     Posture:  decreased WB R LE.  Compression stockings R distal LE.      Gait/stairs:  decreased stance time, decreased hip ext and trunk rotation, min hesitation with R LE WB and step to pattern on stairs with B UE support with L LE WB.      Balance:   R SLS:  Unable    ROM:  R  knee ext/flex:  A: -16/85 after stretching:  A: -8 /AA:  91    MMT:   Hip flexors: 4-/5  ext: supine leg press:  4-/5  abd: 4-/5  add: 3-/5  Quad lag: 15  Hams: supine:  4-/5  DF: 4/5  PF: (NWB):  4/5    Flexibility:    Hams:  90/90: -32  Heelcords: 0  Hip flexors:  mod/sev    Palpation:  mod LE edema and scabbing over incision and some redness noted.      Treatment:    Evaluation:  30 minutes    **= HEP  NV= Next visit  np= not performed  nb= non-billable  G= group  HEP= discharged to [facility].   **Issue standing HEP NV**  Therapeutic Exercise:  10  Nu-step(subjective taken/education):    NV  Stair R  knee flex/ext stretch:  NV  R hip flexor/calf stretch:  NV    LAQ:   "NV  seated hams curl with t-band: NV    HS with belt:  10/10\"**  QS with towel roll under heel and knee:  15/5\"**  SAQ:  NV    NMR:     R SLS:  NV  R T-band TKE:  NV  Partial squats:  NV    R knee ROM:  A: -8 /AA: 91 after stretching    Modalities:    CP to R knee x 10' supine with bolster.     Self Care Home Management:    minutes  Education:  poc, anatomy, physiology, posture, body mechanics, safety awareness, HEP.  Preferred learning:  pictures, demonstration.  Demonstrated good understanding.                                             Current Participants as of 8/13/2024      Name Type Comments Contact Info    Jordan Dawson MD Referring Provider  [phone]    Signature pending    Inna Patel PT Physical Therapist  [phone]    Electronically signed by Inna Patel PT at 7/26/2024 1500 EDT        "

## 2024-07-26 NOTE — Clinical Note
July 26, 2024    [place]  [address] [facility]  [facility] [address]    Patient: Edelmira Montesinos   YOB: 1945   Date of Visit: 7/26/2024       Dear Jordan Dawson MD  [address] [id number]    The attached plan of care is being sent to you because your patient’s medical reimbursement requires that you certify the plan of care. Your signature is required to allow uninterrupted insurance coverage.      You may indicate your approval by signing below and faxing this form back to us at Dept Fax: [phone].    Please call Dept: [phone] with any questions or concerns.    Thank you for this referral,        Inna Patel, PT  [facility] [address] [facility]  [address] [facility] [address]    Payer: Payor: MEDICARE / Plan: MEDICARE PART A AND B / Product Type: *No Product type* /                                                                         Date:     Dear Inna Patel PT,     Re: Ms. Edelmira Montesinos, MRN:[id number]    I certify that I have reviewed the attached plan of care and it is medically necessary for Ms. Edelmira Montesinos (1945) who is under my care.          ______________________________________                    _________________  Provider name and credentials                                           Date and time                                                                                           Plan of Care 7/26/24   Effective from: 7/26/2024  Effective to: 10/23/2024    Plan ID: [id number]            Participants as of Finalize on 7/26/2024    Name Type Comments Contact Info    Jordan Dawson MD Referring Provider  [phone]    Inna Patel PT Physical Therapist  [phone]       Last Plan Note     Author: Inna Patel PT Status: Incomplete Last edited: 7/26/2024  2:00 PM       Patient Name Edelmira Montesinos  MRN: [id number]  Today's Date: 7/26/2024  Time  Calculation  Start Time: 0200  Stop Time: 0245  Time Calculation (min): 45 min    Insurance:   Visit #: 1  Insurance Reviewed  (per information provided by  pre-cert team)  [facility] certification date range:  7-26-24/10-23-24    Therapy Diagnoses:   Problem List Items Addressed This Visit             ICD-10-CM    Presence of right artificial knee joint Z96.651     General:  Reason for visit: s/p R TKR   Referred by: Jordan Shen MD appt:  none scheduled  Preferred Name:  Edelmira  Script:  PT  Onset Date:  6-19-24  Most recent assessment/re-assessment: 7-26-24  Email/phone #:  [email or web address]    Assessment:    Evolving with changing characteristics  Level of complexity:  low  Patient with recent TKR, needs work on/Skilled PT for ROM, flexibility, strength, balance, closed chain, gait and stair activities for improved overall function, signs and symptoms are consistent with diagnosis and patient is an excellent candidate for Physical Therapy.    Problems:    Pain:  __x_  Posture/Body mechanics deficits:  __x_  Decreased knowledge HEP:  __x_  Decreased knowledge of Precautions:  __x_  Activity Limitations:   _x__  ADL's/IADL's/Self-care skills:  __x_  ROM/Joint Mobility:  __x_  Strength:  _x__  Decreased functional level:   _x__  Flexibility:  _x__  Tenderness/decreased soft tissue mobility:  __x_  Gait/Stairs:  _x__  Fall Risk:  _x__  Balance:  _x__  Edema:  ___  Participation restrictions:  ___  Sensory:  ___  Transfers:  ___  Decreased knowledge of brace:   ___  Other:  ___    X Indicates included in problem list    Goals:    STG:  In two weeks.   -Increased postural awareness.   -Compliant with HEP  LTG: by discharge  -Increased postural awareness and posture WFL.  -Increased function with ADL's and IADL's.  Improve LEFS to:  20 %  limitation of function.  -Normal gait pattern  on level and uneven surfaces community level distances for improved function in the community.   -Normal reciprocal  "pattern on stairs for improved function to all levels of home.    -Increase  R SLS to 15\"  -Increase R knee AROM to 0/120 for improved function with car transfers.   -Increase R LE strength to WNL for improved function with chores and volunteer duties.  -Increase R LE flexibility to WFL.    -I and compliant with HEP and proper: R LE care.      Rehab potential to achieve the above goals is good.    Patient is aware of diagnosis and prognosis and agrees with established plan of care and goals.    Plan:   Skilled PT 2 x/week for 12 weeks( Until goals achieved, maximum rehab potential has been achieved, or patient has plateaued)  for:    Aquatics  _____  CP   __x__  Vasopneumatic device __x__  Dry needling  ____  Education  _x___  Electrical Stimulation  __x__  Gait training  __x__  HEP  __x__  Manual  ___x_  Mechanical Traction  ____  NMR  __x__  Self-care/home management  _x___  Therapeutic Exercise   ___x_  Therapeutic Activities  __x__  US  __x__  Work Conditioning  ____  Re-assessment  _x___  Other  ____    X Indicates included in treatment plan    PT for Nu-step for functional mobility and soft tissue warm up for more efficient stretching, work on ROM, flexibility, strength, balance, closed chain, gait and stair activities for improved overall function.  PROM to work on ROM and modalities prn.      Subjective:  HPI: Patient with severe R knee OA and history of R TKR on 6-19-24 and hospitalized for 3 days due to flare up of L knee pain which cortisone has helped.  Patient with [facility] until 7-18-24. Patient is referred to outpatient PT.  Patient had redness over incision at MD appointment this week and was put on antibiotics.  Compliant with supine HEP 3 x/day    Pain:  4-5/10  Type of pain:  post-op knee pain/discomfort  What increases pain: after exercise and walking.    What decreases pain:  rest, no longer using ice    Medical Hx/  Fall Risk:  no  Steadi:  4  yes  Precautions: HTN, s// R TKR and L knee OA and will " need L TKR.     Human Trafficking risk:  No    Patient goal:  Decreased pain and increased function.   Patient is aware of diagnosis and prognosis.    Living Environment:  ranch with basement laundry with rail/4 access steps with one rail.    Social Support:  lives with:  /son  DME:     ww/straight cane, RTS.     Prior Function:   has not driven for over one year due HTN issues, ambulation with cane prior to surgery.      Function:    A and O x 3  Sleep: up to the bathroom, supine, instructed in proper postures.  ADL's:  I with dressing and showering, using walker in the shower.    Chores:  some light chores, would like to resume yard work and gardening.    Driving: hopes to resume driving, has not for one year due to HTN issues.  Slow with transfers.    Work:  housewife.    Recreation: wants to resume activities around the house and with family.      Objective:    Outcome Measures:  Other Measures  Lower Extremity Funtional Score (LEFS): 30 53% limitation of function.     Posture:  decreased WB R LE.  Compression stockings R distal LE.      Gait/stairs:  decreased stance time, decreased hip ext and trunk rotation, min hesitation with R LE WB and step to pattern on stairs with B UE support with L LE WB.      Balance:   R SLS:  Unable    ROM:  R  knee ext/flex:  A: -16/85 after stretching:  A: -8 /AA:  91    MMT:   Hip flexors: 4-/5  ext: supine leg press:  4-/5  abd: 4-/5  add: 3-/5  Quad lag: 15  Hams: supine:  4-/5  DF: 4/5  PF: (NWB):  4/5    Flexibility:    Hams:  90/90: -32  Heelcords: 0  Hip flexors:  mod/sev    Palpation:  mod LE edema and scabbing over incision and some redness noted.      Treatment:    Evaluation:  30 minutes    **= HEP  NV= Next visit  np= not performed  nb= non-billable  G= group  HEP= discharged to [facility].   **Issue standing HEP NV**  Therapeutic Exercise:  10  Nu-step(subjective taken/education):    NV  Stair R  knee flex/ext stretch:  NV  R hip flexor/calf stretch:  NV    LAQ:   "NV  seated hams curl with t-band: NV    HS with belt:  10/10\"**  QS with towel roll under heel and knee:  15/5\"**  SAQ:  NV    NMR:     R SLS:  NV  R T-band TKE:  NV  Partial squats:  NV    R knee ROM:  A: -8 /AA: 91 after stretching    Modalities:    CP to R knee x 10' supine with bolster.     Self Care Home Management:    minutes  Education:  poc, anatomy, physiology, posture, body mechanics, safety awareness, HEP.  Preferred learning:  pictures, demonstration.  Demonstrated good understanding.                                             Current Participants as of 7/26/2024    Name Type Comments Contact Info    Jordan Dawson MD Referring Provider  [phone]    Signature pending    Inna Patel PT Physical Therapist  [phone]    Electronically signed by Inna Patel PT at 7/26/2024 1500 EDT      "

## 2024-07-30 ENCOUNTER — TREATMENT (OUTPATIENT)
Dept: PHYSICAL THERAPY | Facility: CLINIC | Age: 79
End: 2024-07-30
Payer: MEDICARE

## 2024-07-30 DIAGNOSIS — Z96.651 PRESENCE OF RIGHT ARTIFICIAL KNEE JOINT: ICD-10-CM

## 2024-07-30 PROCEDURE — 97110 THERAPEUTIC EXERCISES: CPT | Mod: GP | Performed by: PHYSICAL THERAPIST

## 2024-07-30 PROCEDURE — 97112 NEUROMUSCULAR REEDUCATION: CPT | Mod: GP | Performed by: PHYSICAL THERAPIST

## 2024-07-30 NOTE — PROGRESS NOTES
"Physical Therapy  Physical Therapy Progress Note    Patient Name Edelmira Montesinos   MRN: [id number]  Today's Date: 7/30/2024  Time Calculation  Start Time: 1115  Stop Time: 1210  Time Calculation (min): 55 min    Insurance:    Visit #:   2   Insurance Reviewed  (per information provided by  pre-cert team)  [facility] certification date range:  7-26-24/10-23-24    Therapy Diagnoses:   Problem List Items Addressed This Visit             ICD-10-CM    Presence of right artificial knee joint Z96.651     General:  Reason for visit: s/p R TKR   Referred by: Jordan Shen MD appt:  none scheduled  Preferred Name:  Edelmira  Script:  PT  Onset Date:  6-19-24  Most recent assessment/re-assessment: 7-26-24  Email/phone #:  [email or web address]    Access Code: [email or web address]    Assessment:  Patient tolerated treatment well, did well with progression this date.    Patient needs continued work on/skilled PT for: ROM, strength and gait/stairs/balance to address remaining functional, objective and subjective deficits to allow them to return to prior /optimal level of function with ADLs.  Patient is progressing with goals: improving ROM  Skilled care:  exercise and HEP progression.      Plan:    Continue to progress per poc:   NV add step activities/t-band TKE's and partial squats and add Tband TKE's and partial squats to HEP and issue handouts NV.      Subjective:   Patient reports:  that she took the last dose of antibiotic and feels that redness is down, but she is getting occasional sharp pains in the R knee.      Have you fallen since last visit:  no    Precautions:  no fall risk  HTN, s// R TKR and L knee OA and will need L TKR.     Pain:  0/10, up to 8/10 last night with trying to sleep.   Location/Type of pain:  sharp ant/medial knee    HEP compliance/understanding:  yes    Objective:   Objective Measurements:    Gait: ambulating with ww with good heel toe pattern.   Balance: R SLS:  10\"  ROM:  R knee ROM:  A: -6 progressed " "top -3 after stretching /AA: 101 after stretching      Treatment:   **= HEP  NV= Next visit  np= not performed  nb= non-billable  G= group HEP= discharged to HEP  Therapeutic Exercise:     20 minutes  Nu-step(subjective taken/education):    Lev 3 x 10'  Stair R  knee flex:  10/10\"**/ext stretch:  3/30\"**  R hip flexor/calf stretch:  3/30\"**  HS with belt:  10/10\"**  QS with towel rolls under knee and heel:  15/5\"**    Manual Therapy:     2 minutes  PROM into R knee ext:  10\" x 5     Neuromuscular Re-education:    20 minutes  R SLS:  x 10/10\" max**  R T-band TKE:  NV  Partial squats:  NV  Step ups for/lat:  NV    Education:  exercise and HEP progression and gait training  HEP Progression:    Access Code: [email or web address]  URL: [email or web address]  Date: 07/30/2024  Prepared by: Inna Patel    Exercises  - Standing Knee Flexion Stretch on Step  - 1 x daily - 7 x weekly - 1 sets - 10 reps - 10 second hold  - Standing Hamstring Stretch on Chair  - 1-2 x daily - 7 x weekly - 1 sets - 3 reps - 30 second hold  - Standing Hip Flexor Stretch with Chair  - 1-2 x daily - 7 x weekly - 1 sets - 3 reps - 30 second hold  - Single Leg Stance  - 1 x daily - 7 x weekly - 1 sets - 5-10 reps - up to 30 second hold  "

## 2024-08-01 ENCOUNTER — TREATMENT (OUTPATIENT)
Dept: PHYSICAL THERAPY | Facility: CLINIC | Age: 79
End: 2024-08-01
Payer: MEDICARE

## 2024-08-01 DIAGNOSIS — Z96.651 PRESENCE OF RIGHT ARTIFICIAL KNEE JOINT: ICD-10-CM

## 2024-08-01 PROCEDURE — 97112 NEUROMUSCULAR REEDUCATION: CPT | Mod: GP,CQ

## 2024-08-01 PROCEDURE — 97110 THERAPEUTIC EXERCISES: CPT | Mod: GP,CQ

## 2024-08-01 NOTE — PROGRESS NOTES
Physical Therapy  Physical Therapy Progress Note    Patient Name Edelmira Montesinos   MRN: [id number]  Today's Date: 8/1/2024  Time Calculation  Start Time: 0930  Stop Time: 1025  Time Calculation (min): 55 min    Insurance:    Visit #:   3   Insurance Reviewed  (per information provided by  pre-cert team)  [facility] certification date range:  7-26-24/10-23-24    Therapy Diagnoses:   Problem List Items Addressed This Visit             ICD-10-CM       Musculoskeletal and Injuries    Presence of right artificial knee joint Z96.651     General:  Reason for visit: s/p R TKR   Referred by: Jordan Shen MD appt:  none scheduled  Preferred Name:  Edelmira  Script:  PT  Onset Date:  6-19-24  Most recent assessment/re-assessment: 7-26-24  Email/phone #:  [email or web address]    Access Code: [email or web address]    Assessment:  Patient required frequent cues for correct technique with TKE's.  Used chair for feed back with partial squats for correct retro weight distribution.  Able to progress with step ups with v/c for sequencing.  Increased pain at end range of passive flexion.  Patient very guarded with reps into flexion with encouragement provided to complete all ten reps at end of session.  Increased AROM to 103 degrees at end of session post stretching.  Relief with CP.   Patient needs continued work on/skilled PT for: ROM, strength and gait/stairs/balance to address remaining functional, objective and subjective deficits to allow them to return to prior /optimal level of function with ADLs.  Patient is progressing with goals: improving ROM  Skilled care:  therapeutic exercise progression, patient education    Plan:    Continue to progress per poc:   Add 3 Way hip next visit.     Subjective:   Patient reports:  increased stiffness after PT.  Patient woke up in the middle of the night with pain and had to take a Tylenol to go back to sleep.    Have you fallen since last visit:  no    Precautions:  no fall risk  HTN, s// R TKR  "and L knee OA and will need L TKR.     Pain:  0/10, up to 8/10 last night with trying to sleep.   Location/Type of pain:  sharp ant/medial knee    HEP compliance/understanding:  yes    Objective:   Objective Measurements:    Gait: decreased toe off in terminal stance this date  ROM:  R knee  AROM: 103 after stretching      Treatment:   **= HEP  NV= Next visit  np= not performed  nb= non-billable  G= group HEP= discharged to [facility]  Therapeutic Exercise:     20 minutes  Nu-step(subjective taken/education):    Lev 3 x 10'  Stair R  knee flex:  10/10\"**/ext stretch:  3/30\"**  R hip flexor/calf stretch:  3/30\"**  HS with belt:  10/10\"**/NV  QS with towel rolls under knee and heel:  15/5\"**/NV    Manual Therapy:     5 minutes  PROM into R knee ext:  10\" x 5     Neuromuscular Re-education:    20 minutes  R SLS:  x 10/10\" max**  R T-band TKE:  RTB 5\" hold x 15**  Partial squats:  2 x 10  Step ups for/lat:  4\" x 10 each, R. LE leading    Modalities:  CP applied to R. Knee in supine x 10' at end of session    Education:  V/c for correct technique and posture with progression of exercises.  Frequency of icing throughout the day.    HEP Progression:  TKE's, partial squats  Access Code: [email or web address]  URL: [email or web address]  Date: 08/01/2024  Prepared by: Dali Ngo    Exercises  - Standing Knee Flexion Stretch on Step  - 1 x daily - 7 x weekly - 1 sets - 10 reps - 10 second hold  - Standing Hamstring Stretch on Chair  - 1-2 x daily - 7 x weekly - 1 sets - 3 reps - 30 second hold  - Standing Hip Flexor Stretch with Chair  - 1-2 x daily - 7 x weekly - 1 sets - 3 reps - 30 second hold  - Single Leg Stance  - 1 x daily - 7 x weekly - 1 sets - 5-10 reps - up to 30 second hold  - Standing Terminal Knee Extension with Resistance  - 1 x daily - 7 x weekly - 2 sets - 10 reps - 5 second hold  - Mini Squat with Counter Support  - 1 x daily - 7 x weekly - 2 sets - 10 reps  "

## 2024-08-02 ENCOUNTER — APPOINTMENT (OUTPATIENT)
Dept: CARDIOLOGY | Facility: CLINIC | Age: 79
End: 2024-08-02
Payer: MEDICARE

## 2024-08-02 VITALS
BODY MASS INDEX: 27.6 KG/M2 | WEIGHT: 150 LBS | SYSTOLIC BLOOD PRESSURE: 124 MMHG | HEART RATE: 105 BPM | DIASTOLIC BLOOD PRESSURE: 60 MMHG | HEIGHT: 62 IN | OXYGEN SATURATION: 95 %

## 2024-08-02 DIAGNOSIS — E78.49 OTHER HYPERLIPIDEMIA: ICD-10-CM

## 2024-08-02 DIAGNOSIS — I10 PRIMARY HYPERTENSION: ICD-10-CM

## 2024-08-02 DIAGNOSIS — I83.93 ASYMPTOMATIC VARICOSE VEINS OF BOTH LOWER EXTREMITIES: Primary | ICD-10-CM

## 2024-08-02 DIAGNOSIS — I25.118 CORONARY ARTERY DISEASE OF NATIVE ARTERY OF NATIVE HEART WITH STABLE ANGINA PECTORIS (CMS-HCC): ICD-10-CM

## 2024-08-02 DIAGNOSIS — I47.10 PSVT (PAROXYSMAL SUPRAVENTRICULAR TACHYCARDIA) (CMS-HCC): ICD-10-CM

## 2024-08-02 PROBLEM — R07.89 CHEST TIGHTNESS: Status: RESOLVED | Noted: 2023-11-20 | Resolved: 2024-08-02

## 2024-08-02 PROBLEM — R03.0 FINDING OF ABOVE NORMAL BLOOD PRESSURE: Status: RESOLVED | Noted: 2024-06-26 | Resolved: 2024-08-02

## 2024-08-02 RX ORDER — ASPIRIN 81 MG/1
81 TABLET ORAL
COMMUNITY
Start: 2024-06-21

## 2024-08-02 NOTE — PATIENT INSTRUCTIONS
No changes in your medications.    Your heart scans are normal.      We will give the ok for your knee surgery.

## 2024-08-02 NOTE — PROGRESS NOTES
Subjective   Edelmira Montesinos is a 78 y.o. female.    Chief Complaint:  Follow-up hypertension.  Drowsy.  Preoperative evaluation    HPI    Since her last visit she underwent right total knee replacement.  This was done by Dr. Alba.  No cardiac complications.  Her chest pains have resolved.  Blood pressures at home have been normal.  Feels drowsy at times during the day.  She feels that she gets a decent night sleep although she does admit that her knee problems do keep her up at night.    Her diagnosis of coronary artery disease is based on findings of calcifications in the distribution of the coronary arteries seen on a prior CT scan of the chest.     Her past cardiac history is unremarkable. There is no history of diabetes. There is no smoking history. No family history of premature coronary artery disease.     She has had a number of side effects to multiple medications.     She has been seen by the neurology service. It was felt that she may have subtle changes of Parkinson's.     Other medical problems include a history of varicose veins and degenerative arthritis.     Allergies  Medication    · bisoprolol   Recorded By: Sera Crawford; 6/15/2020 8:49:56 AM   · metoprolol   Recorded By: Sera Crawford; 6/15/2020 8:49:56 AM   · spironolactone   Recorded By: Sera Crawford; 2020 12:07:52 PM   · telmisartan   Recorded By: Sera Crawford; 8/10/2020 10:09:16 AM     Family History  Mother    · Family history of    · Family history of Old age  Father    · Family history of cerebral infarction (V17.1) (Z82.3)     Social History  Problems    · Daily caffeine consumption   · Never a smoker   · No alcohol use        Review of Systems   Constitutional: Positive for malaise/fatigue.   Cardiovascular:  Positive for dyspnea on exertion.   Musculoskeletal:  Positive for arthritis and joint pain.   Neurological:  Positive for weakness.       Current Outpatient Medications   Medication Sig Dispense Refill     albuterol 90 mcg/actuation inhaler Inhale 2 puffs every 4 hours if needed for wheezing or shortness of breath.      azithromycin (Zithromax) 250 mg tablet Take 2 tablets (500 mg) on  Day 1,  followed by 1 tablet (250 mg) once daily on Days 2 through 5. 6 tablet 0    benzonatate (Tessalon Perles) 100 mg capsule Take 1 capsule (100 mg) by mouth 3 times a day as needed for cough. 30 capsule 0    calcium citrate-vitamin D3 315 mg-6.25 mcg (250 unit) tablet Take 1 tablet (315 mg) by mouth 2 times a day with meals.      losartan (Cozaar) 25 mg tablet Take 1 tablet (25 mg) by mouth once daily.      metoprolol succinate XL (Toprol-XL) 25 mg 24 hr tablet Take 0.5 tablets (12.5 mg) by mouth once daily. Do not crush or chew. 45 tablet 3     No current facility-administered medications for this visit.        Visit Vitals  Smoking Status Never        Objective     Constitutional:       Appearance: Not in distress.   Neck:      Vascular: JVD normal.   Pulmonary:      Breath sounds: Normal breath sounds.   Cardiovascular:      Normal rate. Regular rhythm. Normal S1. Normal S2.       Murmurs: There is no murmur.      No gallop.    Pulses:     Intact distal pulses.   Edema:     Peripheral edema present.     Pretibial: 1+ edema of the right pretibial area.     Ankle: 1+ edema of the right ankle.  Abdominal:      General: There is no distension.      Palpations: Abdomen is soft.   Neurological:      Mental Status: Alert.         Lab Review:   Lab Results   Component Value Date     02/07/2021    K 4.0 02/07/2021     02/07/2021    CO2 26 02/07/2021    BUN 17 02/07/2021    CREATININE 0.76 02/07/2021    GLUCOSE 94 02/07/2021    CALCIUM 9.1 02/07/2021       Assessment:    1.  Hypertension.  Blood pressures are well-controlled.  On a combination of beta-blocker and losartan.  He is a very low dose but they are adequately controlling her blood pressures.    2.  Coronary artery disease.  Based on the findings of calcifications  in the distribution of the coronary arteries.  She had a recent stress thallium study done in February 2024 which showed no ischemia with normal left ventricular function.    3.  Drowsiness.  Does have a facial tremor.  She has been evaluated in the past for possible Parkinson's disease.

## 2024-08-05 ENCOUNTER — CLINICAL SUPPORT (OUTPATIENT)
Dept: PHYSICAL THERAPY | Facility: CLINIC | Age: 79
End: 2024-08-05
Payer: MEDICARE

## 2024-08-05 DIAGNOSIS — Z96.651 PRESENCE OF RIGHT ARTIFICIAL KNEE JOINT: ICD-10-CM

## 2024-08-05 PROCEDURE — 97112 NEUROMUSCULAR REEDUCATION: CPT | Mod: GP,CQ

## 2024-08-05 PROCEDURE — 97110 THERAPEUTIC EXERCISES: CPT | Mod: GP,CQ

## 2024-08-05 NOTE — PROGRESS NOTES
Physical Therapy  Physical Therapy Progress Note    Patient Name Edelmira Montesinos   MRN: [id number]  Today's Date: 8/5/2024  Time Calculation  Start Time: 0145  Stop Time: 0240  Time Calculation (min): 55 min    Insurance:    Visit #:   4   Insurance Reviewed  (per information provided by  pre-cert team)  [facility] certification date range:  7-26-24/10-23-24    Therapy Diagnoses:   Problem List Items Addressed This Visit             ICD-10-CM       Musculoskeletal and Injuries    Presence of right artificial knee joint Z96.651     General:  Reason for visit: s/p R TKR   Referred by: Jordan Shen MD appt:  none scheduled  Preferred Name:  Edelmira  Script:  PT  Onset Date:  6-19-24  Most recent assessment/re-assessment: 7-26-24  Email/phone #:  [email or web address]    Access Code: [email or web address]    Assessment:  Progressed patient with standing hip extension and abduction.  Required t/c at hips initially to avoid compensatory mechanisms.   Improved tolerance to WB exercises.  Fatigued at end of session..  Relief with CP.   Patient needs continued work on/skilled PT for: ROM, strength and gait/stairs/balance to address remaining functional, objective and subjective deficits to allow them to return to prior /optimal level of function with ADLs.  Patient is progressing with goals: improving ROM  Skilled care:  therapeutic exercise progression, patient education    Plan:    Continue to progress per poc:   Add standing hip flexion next visit.  Add resistance as able.     Subjective:   Patient reports:  states she called her surgeon's office this morning secondary to increased redness near incisional region, but they have not called her back yet.  Advised to call again after therapy.  No new complaints or increased symptoms with progression of exercises.    Have you fallen since last visit:  no    Precautions:  no fall risk  HTN, s// R TKR and L knee OA and will need L TKR.     Pain:  0/10, up to 8/10 last night with  "trying to sleep.   Location/Type of pain:  sharp, shooting ant/medial knee    HEP compliance/understanding:  yes    Objective:   Objective Measurements:    R. Knee:  red around incision and inferior to incision.  Normal skin temp when compared to other leg.        Treatment:   **= HEP  NV= Next visit  np= not performed  nb= non-billable  G= group HEP= discharged to HEP  Therapeutic Exercise:     28 minutes  Nu-step(subjective taken/education):    Lev 3 x 10'  Stair R  knee flex:  10/10\"**/ext stretch:  3/30\"**  R hip flexor/calf stretch:  3/30\"**  HS with belt:  10/10\"**  QS with towel rolls under knee and heel:  15/5\"**  Hip extension, abduction x 10 bladimir, each  Marching x 20  LAQ 2 x 10 R. LE    Manual Therapy:     2 minutes  PROM into R knee ext:  10\" x 5     Neuromuscular Re-education:    15 minutes  R SLS:  x 10/10\" max**/NV  R T-band TKE:  RTB 5\" hold x 15**  Partial squats:  2 x 10  Step ups for/lat:  4\" x 10 each, R. LE leading    Modalities:  CP applied to R. Knee in seated x 10' at end of session    Education:  V/c for correct technique and posture with progression of exercises.  Frequency of icing throughout the day.    HEP Progression:  N/A  Access Code: [email or web address]  URL: [email or web address]  Date: 08/01/2024  Prepared by: Dali Ngo    Exercises  - Standing Knee Flexion Stretch on Step  - 1 x daily - 7 x weekly - 1 sets - 10 reps - 10 second hold  - Standing Hamstring Stretch on Chair  - 1-2 x daily - 7 x weekly - 1 sets - 3 reps - 30 second hold  - Standing Hip Flexor Stretch with Chair  - 1-2 x daily - 7 x weekly - 1 sets - 3 reps - 30 second hold  - Single Leg Stance  - 1 x daily - 7 x weekly - 1 sets - 5-10 reps - up to 30 second hold  - Standing Terminal Knee Extension with Resistance  - 1 x daily - 7 x weekly - 2 sets - 10 reps - 5 second hold  - Mini Squat with Counter Support  - 1 x daily - 7 x weekly - 2 sets - 10 reps  "

## 2024-08-09 ENCOUNTER — TREATMENT (OUTPATIENT)
Dept: PHYSICAL THERAPY | Facility: CLINIC | Age: 79
End: 2024-08-09
Payer: MEDICARE

## 2024-08-09 DIAGNOSIS — Z96.651 PRESENCE OF RIGHT ARTIFICIAL KNEE JOINT: Primary | ICD-10-CM

## 2024-08-09 PROCEDURE — 97110 THERAPEUTIC EXERCISES: CPT | Mod: GP | Performed by: PHYSICAL THERAPIST

## 2024-08-09 PROCEDURE — 97116 GAIT TRAINING THERAPY: CPT | Mod: GP | Performed by: PHYSICAL THERAPIST

## 2024-08-09 PROCEDURE — 97112 NEUROMUSCULAR REEDUCATION: CPT | Mod: GP | Performed by: PHYSICAL THERAPIST

## 2024-08-09 NOTE — LETTER
August 9, 2024    Jordan Dawson MD  [address]  [place] [address]    Patient: Edelmira Montesinos   YOB: 1945   Date of Visit: 8/9/2024       Dear Jordan Dawson MD  [address]  [place] [address]    The attached plan of care is being sent to you because your patient’s medical reimbursement requires that you certify the plan of care. Your signature is required to allow uninterrupted insurance coverage.      You may indicate your approval by signing below and faxing this form back to us at Dept Fax: [phone].    Please call Dept: [phone] with any questions or concerns.    Thank you for this referral,        Inna Patel, PT  [facility] [address]  [address] [address] [address]    Payer: Payor: MEDICARE / Plan: MEDICARE PART A AND B / Product Type: *No Product type* /                                                                         Date:     Dear Inna Patel, PT,     Re: Ms. Edelmira Montesinos, MRN:[id number]    I certify that I have reviewed the attached plan of care and it is medically necessary for Ms. Edelmira Montesinos (1945) who is under my care.          ______________________________________                    _________________  Provider name and credentials                                           Date and time                                                                                           Plan of Care 7/26/24   Effective from: 7/26/2024  Effective to: 10/23/2024    Plan ID: [id number]                Participants as of Finalize on 7/26/2024      Name Type Comments Contact Info    Jordan Dawson MD Referring Provider  [phone]    Inna Patel, PT Physical Therapist  [phone]           Last Plan Note       Author: Inna Patel PT Status: Incomplete Last edited: 7/26/2024  2:00 PM         Patient Name Edelmira Montesinos  MRN: [id number]  Today's Date: 7/26/2024  Time  Calculation  Start Time: 0200  Stop Time: 0245  Time Calculation (min): 45 min    Insurance:   Visit #: 1  Insurance Reviewed  (per information provided by  pre-cert team)  [facility] certification date range:  7-26-24/10-23-24    Therapy Diagnoses:   Problem List Items Addressed This Visit             ICD-10-CM    Presence of right artificial knee joint Z96.651     General:  Reason for visit: s/p R TKR   Referred by: Jordan Shen MD appt:  none scheduled  Preferred Name:  Edelmira  Script:  PT  Onset Date:  6-19-24  Most recent assessment/re-assessment: 7-26-24  Email/phone #:  [email or web address]    Assessment:    Evolving with changing characteristics  Level of complexity:  low  Patient with recent TKR, needs work on/Skilled PT for ROM, flexibility, strength, balance, closed chain, gait and stair activities for improved overall function, signs and symptoms are consistent with diagnosis and patient is an excellent candidate for Physical Therapy.    Problems:    Pain:  __x_  Posture/Body mechanics deficits:  __x_  Decreased knowledge HEP:  __x_  Decreased knowledge of Precautions:  __x_  Activity Limitations:   _x__  ADL's/IADL's/Self-care skills:  __x_  ROM/Joint Mobility:  __x_  Strength:  _x__  Decreased functional level:   _x__  Flexibility:  _x__  Tenderness/decreased soft tissue mobility:  __x_  Gait/Stairs:  _x__  Fall Risk:  _x__  Balance:  _x__  Edema:  ___  Participation restrictions:  ___  Sensory:  ___  Transfers:  ___  Decreased knowledge of brace:   ___  Other:  ___    X Indicates included in problem list    Goals:    STG:  In two weeks.   -Increased postural awareness.   -Compliant with HEP  LTG: by discharge  -Increased postural awareness and posture WFL.  -Increased function with ADL's and IADL's.  Improve LEFS to:  20 %  limitation of function.  -Normal gait pattern  on level and uneven surfaces community level distances for improved function in the community.   -Normal reciprocal  "pattern on stairs for improved function to all levels of home.    -Increase  R SLS to 15\"  -Increase R knee AROM to 0/120 for improved function with car transfers.   -Increase R LE strength to WNL for improved function with chores and volunteer duties.  -Increase R LE flexibility to WFL.    -I and compliant with HEP and proper: R LE care.      Rehab potential to achieve the above goals is good.    Patient is aware of diagnosis and prognosis and agrees with established plan of care and goals.    Plan:   Skilled PT 2 x/week for 12 weeks( Until goals achieved, maximum rehab potential has been achieved, or patient has plateaued)  for:    Aquatics  _____  CP   __x__  Vasopneumatic device __x__  Dry needling  ____  Education  _x___  Electrical Stimulation  __x__  Gait training  __x__  HEP  __x__  Manual  ___x_  Mechanical Traction  ____  NMR  __x__  Self-care/home management  _x___  Therapeutic Exercise   ___x_  Therapeutic Activities  __x__  US  __x__  Work Conditioning  ____  Re-assessment  _x___  Other  ____    X Indicates included in treatment plan    PT for Nu-step for functional mobility and soft tissue warm up for more efficient stretching, work on ROM, flexibility, strength, balance, closed chain, gait and stair activities for improved overall function.  PROM to work on ROM and modalities prn.      Subjective:  HPI: Patient with severe R knee OA and history of R TKR on 6-19-24 and hospitalized for 3 days due to flare up of L knee pain which cortisone has helped.  Patient with [facility] until 7-18-24. Patient is referred to outpatient PT.  Patient had redness over incision at MD appointment this week and was put on antibiotics.  Compliant with supine HEP 3 x/day    Pain:  4-5/10  Type of pain:  post-op knee pain/discomfort  What increases pain: after exercise and walking.    What decreases pain:  rest, no longer using ice    Medical Hx/  Fall Risk:  no  Steadi:  4  yes  Precautions: HTN, s// R TKR and L knee OA and will " need L TKR.     Human Trafficking risk:  No    Patient goal:  Decreased pain and increased function.   Patient is aware of diagnosis and prognosis.    Living Environment:  ranch with basement laundry with rail/4 access steps with one rail.    Social Support:  lives with:  /son  DME:     ww/straight cane, RTS.     Prior Function:   has not driven for over one year due HTN issues, ambulation with cane prior to surgery.      Function:    A and O x 3  Sleep: up to the bathroom, supine, instructed in proper postures.  ADL's:  I with dressing and showering, using walker in the shower.    Chores:  some light chores, would like to resume yard work and gardening.    Driving: hopes to resume driving, has not for one year due to HTN issues.  Slow with transfers.    Work:  housewife.    Recreation: wants to resume activities around the house and with family.      Objective:    Outcome Measures:  Other Measures  Lower Extremity Funtional Score (LEFS): 30 53% limitation of function.     Posture:  decreased WB R LE.  Compression stockings R distal LE.      Gait/stairs:  decreased stance time, decreased hip ext and trunk rotation, min hesitation with R LE WB and step to pattern on stairs with B UE support with L LE WB.      Balance:   R SLS:  Unable    ROM:  R  knee ext/flex:  A: -16/85 after stretching:  A: -8 /AA:  91    MMT:   Hip flexors: 4-/5  ext: supine leg press:  4-/5  abd: 4-/5  add: 3-/5  Quad lag: 15  Hams: supine:  4-/5  DF: 4/5  PF: (NWB):  4/5    Flexibility:    Hams:  90/90: -32  Heelcords: 0  Hip flexors:  mod/sev    Palpation:  mod LE edema and scabbing over incision and some redness noted.      Treatment:    Evaluation:  30 minutes    **= HEP  NV= Next visit  np= not performed  nb= non-billable  G= group  HEP= discharged to [facility].   **Issue standing HEP NV**  Therapeutic Exercise:  10  Nu-step(subjective taken/education):    NV  Stair R  knee flex/ext stretch:  NV  R hip flexor/calf stretch:  NV    LAQ:   "NV  seated hams curl with t-band: NV    HS with belt:  10/10\"**  QS with towel roll under heel and knee:  15/5\"**  SAQ:  NV    NMR:     R SLS:  NV  R T-band TKE:  NV  Partial squats:  NV    R knee ROM:  A: -8 /AA: 91 after stretching    Modalities:    CP to R knee x 10' supine with bolster.     Self Care Home Management:    minutes  Education:  poc, anatomy, physiology, posture, body mechanics, safety awareness, HEP.  Preferred learning:  pictures, demonstration.  Demonstrated good understanding.                                             Current Participants as of 8/9/2024      Name Type Comments Contact Info    Jordan Dawson MD Referring Provider  [phone]    Signature pending    Inna Patel PT Physical Therapist  [phone]    Electronically signed by Inna Patel PT at 7/26/2024 1500 EDT        "

## 2024-08-09 NOTE — PROGRESS NOTES
"Physical Therapy  Physical Therapy Progress Note    Patient Name Edelmira Montesinos   MRN: [id number]  Today's Date: 8/9/2024  Time Calculation  Start Time: 0830  Stop Time: 0925  Time Calculation (min): 55 min    Insurance:    Visit #:   5   Insurance Reviewed  (per information provided by  pre-cert team)  [facility] certification date range:  7-26-24/10-23-24    Therapy Diagnoses:   Problem List Items Addressed This Visit             ICD-10-CM    Presence of right artificial knee joint - Primary Z96.651       General:  Reason for visit: s/p R TKR   Referred by: Jordan Shen MD appt:  none scheduled  Preferred Name:  Edelmira  Script:  PT  Onset Date:  6-19-24  Most recent assessment/re-assessment: 7-26-24  Email/phone #:  [email or web address]    Assessment:  Patient tolerated treatment well, did well with progression this date.    Patient needs continued work on/skilled PT for: ROM, functional strength, balance, gait and stairs to address remaining functional, objective and subjective deficits to allow them to return to prior /optimal level of function with ADLs.  Patient is progressing with goals: improving gait and balance  Needs work on ROM.    Skilled care:  exercise and HEP progression and gait training.     Plan:    Continue to progress per poc:   NV add 6\" step downs to tolerance and take knee flexion and ext ROM measurements and insure progression with manual stretching into extension and into flexion as well if needed to continue to progress ROM.      Subjective:   Patient reports:  saw Dr. Dawson yesterday and he is pleased with progress and he said she should only return to his office if she has increased redness at the distal incision.  Patient has been trying to walk with the cane at home, but has been using it in the R UE, retrained with proper technique.     Have you fallen since last visit:  no    Precautions:   no fall risk  HTN, s// R TKR and L knee OA and will need L TKR.     Pain:  " "4/10  Location/Type of pain:  post op soreness R knee    HEP compliance/understanding:  yes    Objective:   Objective Measurements:    Gait: ambulating with ww most often, able to ambulate safely with straight cane in the house.   Balance: R SLS:  13\"  ROM:  R knee ext/flexion:  A: -5   Strength:  R Quad la(was 15)    Treatment:   **= HEP  NV= Next visit  np= not performed  nb= non-billable  G= group HEP= discharged to HEP  Therapeutic Exercise:     15 minutes  Nu-step(subjective taken/education):    Lev 3 x 10'  Stair R  knee flex:  10/10\"**/ext stretch:  3/30\"**  R hip flexor/calf stretch:  3/30\"**for form and technique    Manual Therapy:       minutes  PROM into R knee ext: 10\" x 5     Neuromuscular Re-education:    15 minutes  R SLS:  x \" max**  Step ups for/lat: B UE support:  6\" x 15 ea.  Step downs:  B UE support:  NV trial 6\"    Gait Training:          10 minutes  Worked on gait with straight cane in therapy gym/instructed in proper use of cane.     Education:  exercise progression, instructed in seated heel prop when watching TV to encourage R knee extension, start with 30\" and work up to 5'.    "

## 2024-08-14 ENCOUNTER — TREATMENT (OUTPATIENT)
Dept: PHYSICAL THERAPY | Facility: CLINIC | Age: 79
End: 2024-08-14
Payer: MEDICARE

## 2024-08-14 DIAGNOSIS — Z96.651 PRESENCE OF RIGHT ARTIFICIAL KNEE JOINT: ICD-10-CM

## 2024-08-14 PROCEDURE — 97112 NEUROMUSCULAR REEDUCATION: CPT | Mod: GP,CQ

## 2024-08-14 PROCEDURE — 97116 GAIT TRAINING THERAPY: CPT | Mod: GP,CQ

## 2024-08-14 PROCEDURE — 97110 THERAPEUTIC EXERCISES: CPT | Mod: GP,CQ

## 2024-08-14 NOTE — PROGRESS NOTES
Physical Therapy  Physical Therapy Progress Note    Patient Name Edelmira Montesinos   MRN: [id number]  Today's Date: 8/14/2024  Time Calculation  Start Time: 0930  Stop Time: 1025  Time Calculation (min): 55 min    Insurance:    Visit #:   6   Insurance Reviewed  (per information provided by  pre-cert team)  [facility] certification date range:  7-26-24/10-23-24    Therapy Diagnoses:   Problem List Items Addressed This Visit             ICD-10-CM       Musculoskeletal and Injuries    Presence of right artificial knee joint Z96.651       General:  Reason for visit: s/p R TKR   Referred by: Jordan Shen MD appt:  none scheduled  Preferred Name:  Edelmira  Script:  PT  Onset Date:  6-19-24  Most recent assessment/re-assessment: 7-26-24  Email/phone #:  [email or web address]    Assessment:  Progressed patient with step downs, leading with L. LE to eccentrically load R. LE with retro step up with L. LE. Able to eccentrically load R. LE without compensation.  V/c to avoid looking at floor and maintain erect posture with step exercises.  Fatigued after step overs.  Max cues with gait training with cane to increase heel strike with initial contact and increase toe off with terminal stance to increase stride length and promote equal weight shift with ambulation.  Able to passively extend knee to 0 degrees post quad sets with manual overpressure. Patient encouraged to continue working on ROM at home.  Fatigued at end of session.  Relief with CP.   Patient needs continued work on/skilled PT for: ROM, functional strength, balance, gait and stairs to address remaining functional, objective and subjective deficits to allow them to return to prior /optimal level of function with ADLs.  Patient is progressing with goals: improving gait and balance.  Needs continued work on ROM.    Skilled care:  exercise progression, gait training, patient education    Plan:    Continue to progress per poc:   Add step overs next visit.  Continue  "gait training with cane.      Subjective:   Patient reports:  she feels drowsy in the morning and cannot walk with a cane then due to instability. Patient believes a visual deficit/issue is contributing to this.  States her knee feels worse at the end of the day compared to the morning.    Have you fallen since last visit:  no    Precautions:   no fall risk  HTN, s// R TKR and L knee OA and will need L TKR.     Pain:  4/10  Location/Type of pain:  post op soreness R knee    HEP compliance/understanding:  yes    Objective:   Objective Measurements:    Gait: L. LE internally rotates and crosses midline in swing phase.  V/c for proximity of cane with gait training.   ROM:  R knee ext/flexion:  A: -4, AAROM: 0 post manual overpressure with quad sets      Treatment:   **= HEP  NV= Next visit  np= not performed  nb= non-billable  G= group HEP= discharged to [facility]  Therapeutic Exercise:     15 minutes  Nu-step(subjective taken/education):    Lev 3 x 10'  Stair R  knee flex:  10/10\"**/ext stretch:  3/30\"**  R hip flexor/calf stretch:  3/30\"**for form and technique    Manual Therapy:     5 minutes  PROM into R knee ext: 10\" x 5     Neuromuscular Re-education:    15 minutes  R SLS:  x 5/13\" max**/NV  Step ups for/lat: B UE support:  6\" x 20 ea.  Step downs:  6\" with L. LE ( R. LE eccentrically loads on box), retro step up with L. LE x 10    Gait Training:          10 minutes  Worked on gait with straight cane in therapy gym/instructed in proper use of cane, 150'    Modalities:  CP applied to R. Knee seated x 10' at end of session    Education:  V/c and sequencing for correct technique and posture with ambulation and step exercises.   "

## 2024-08-16 ENCOUNTER — TREATMENT (OUTPATIENT)
Dept: PHYSICAL THERAPY | Facility: CLINIC | Age: 79
End: 2024-08-16
Payer: MEDICARE

## 2024-08-16 DIAGNOSIS — Z96.651 PRESENCE OF RIGHT ARTIFICIAL KNEE JOINT: ICD-10-CM

## 2024-08-16 PROCEDURE — 97110 THERAPEUTIC EXERCISES: CPT | Mod: GP | Performed by: PHYSICAL THERAPIST

## 2024-08-16 PROCEDURE — 97112 NEUROMUSCULAR REEDUCATION: CPT | Mod: GP | Performed by: PHYSICAL THERAPIST

## 2024-08-16 NOTE — PROGRESS NOTES
"Physical Therapy  Physical Therapy Progress Note    Patient Name Edelmira Montesinos   MRN: [id number]  Today's Date: 8/16/2024  Time Calculation  Start Time: 0830  Stop Time: 0930  Time Calculation (min): 60 min    Insurance:    Visit #:   7   Insurance Reviewed  (per information provided by  pre-cert team)  [facility] certification date range:  7-26-24/10-23-24(sent 3 times)    Therapy Diagnoses:   Problem List Items Addressed This Visit             ICD-10-CM    Presence of right artificial knee joint Z96.651     General:  Reason for visit: s/p R TKR   Referred by: Jordan Shen MD appt:  none scheduled  Preferred Name:  Edelmira  Script:  PT  Onset Date:  6-19-24  Most recent assessment/re-assessment: 7-26-24  Email/phone #:  [email or web address]    Assessment:  Patient tolerated treatment well, did well with progression this date.    Patient needs continued work on/skilled PT for: ROM, functional and closed chain strength and gait quality to address remaining functional, objective and subjective deficits to allow them to return to prior /optimal level of function with ADLs.  Patient is progressing with goals: progressing with gait training  Skilled care:  exercise progression, manual/gait training.      Plan:    Continue to progress per poc:   NV add Airex partial squats and alt lunges.    Subjective:   Patient reports:  that the L knee was bothering her last night when she was sleeping.      Have you fallen since last visit:  no    Precautions:   no fall risk  HTN, s// R TKR and L knee OA and will need L TKR.     Pain:  4-5/10  Location/Type of pain:  ant R tibial quick dull pain intermittently, with \"overdoing it\"    HEP compliance/understanding:  yes    Objective:   Objective Measurements:    Posture: min for flexed  Gait:  walking some without device at home in kitchen where there are things close to hold onto.  Balance:  R SLS:  13\"  ROM:  R knee ext/flex:  A: -2 /AA:  105    Treatment:   **= HEP  NV= " "Next visit  np= not performed  nb= non-billable  G= group HEP= discharged to [facility]  Therapeutic Exercise:     20 minutes  Nu-step(subjective taken/education):    Lev 3 x 10'  Stair R  knee flex:  10/10\"**/ext stretch:  3/30\"**  R hip flexor/calf stretch:  3/30\"**for form and technique  HS with belt:  10/10\" **  QS with towel roll under knee and heel:  15/5\" **    Manual Therapy:     2 minutes  PROM into R knee ext: 10\" x 5 /flexion in conjunction with HS with strap:  2 x 10\"    Neuromuscular Re-education:    15 minutes  R SLS:  x 3/13\" max**  Step ups for/lat: B UE support:  6\" x 25 ea.  Step downs:  6\" with B UE support x 10  Airex Partial squats:  NV  Alt lunges:  NV    Gait Trainin minutes  Worked on gait with straight cane in therapy gym/focus on gait quality, 50'     Modalities:       Cold Pack          10 Minutes  R knee supine with bolster    Education:  re-instructed in cross friction massage to incision now that it is fully healed    "

## 2024-08-20 ENCOUNTER — TREATMENT (OUTPATIENT)
Dept: PHYSICAL THERAPY | Facility: CLINIC | Age: 79
End: 2024-08-20
Payer: MEDICARE

## 2024-08-20 DIAGNOSIS — Z96.651 PRESENCE OF RIGHT ARTIFICIAL KNEE JOINT: ICD-10-CM

## 2024-08-20 PROCEDURE — 97110 THERAPEUTIC EXERCISES: CPT | Mod: GP | Performed by: PHYSICAL THERAPIST

## 2024-08-20 NOTE — PROGRESS NOTES
"Physical Therapy  Physical Therapy Progress Note/re-assessment    Patient Name Edelmira Montesinos   MRN: [id number]  Today's Date: 8/20/2024  Time Calculation  Start Time: 0815  Stop Time: 0925  Time Calculation (min): 70 min    Insurance:    Visit #:   8   Insurance Reviewed  (per information provided by  pre-cert team)  [facility] certification date range:  7-26-24/10-23-24(sent 3 times)    Therapy Diagnoses:   Problem List Items Addressed This Visit             ICD-10-CM    Presence of right artificial knee joint Z96.651    Relevant Orders    Follow Up In Physical Therapy       General:  Reason for visit: s/p R TKR   Referred by: Jordan Shen MD appt:  none scheduled  Preferred Name:  Edelmira  Script:  PT  Onset Date:  6-19-24  Most recent assessment/re-assessment: 7-26-24/8-20-24  Email/phone #:  [email or web address]    Assessment:  Skilled care:  re-assessment  STG:  In two weeks.   -Increased postural awareness.  Met  -Compliant with HEP Met  LTG: by discharge  -Increased postural awareness and posture WFL.  Progressing  -Increased function with ADL's and IADL's.  Improve LEFS to:  20 %  limitation of function. Progressing  -Normal gait pattern  on level and uneven surfaces community level distances for improved function in the community.  Progressing  -Normal reciprocal pattern on stairs for improved function to all levels of home.  Progressing  -Increase  R SLS to 15\".  MetProgressing  -Increase R knee AROM to 0/120 for improved function with car transfers.   -Increase R LE strength to WNL for improved function with chores and volunteer duties. Progressing  -Increase R LE flexibility to WFL.  Progressing  -I and compliant with HEP and proper: R LE care.  Progressing      Plan:    Continue to progress per poc:   NV consider issuing Tubigrip if R distal LE swelling continues/ focus on flexion ROM and continue to progress towards goals, work on gait/stairs.     Subjective:   Patient reports:  that the R " "knee is more flared up today, unsure why, but she did not sleep well due to L knee pain.  Patient has stopped wearing supports stockings.  R LE is a little more swollen today.      Have you fallen since last visit:  no    Precautions:  no fall risk  HTN, s// R TKR and L knee OA and will need L TKR.     Pain:  4/10  Location/Type of pain:  ant R knee pain    HEP compliance/understanding:  yes    Objective:   Objective Measurements:    Sleep: up to the bathroom, supine, instructed in proper postures.  ADL's:  I with dressing and showering, using walker in the shower.  No change  Chores:  doing more cooking and dishes, (was some light chores, would like to resume yard work and gardening. )   Driving: hopes to resume driving, has not for one year due to HTN issues.  Better (was Slow) with transfers.    Work:  housewife.    Recreation: has been able to do more (was wants to resume activities around the house and with family.)       Objective:    Outcome Measures:  Other Measures  Lower Extremity Funtional Score (LEFS): 44/33%(was 30 53%) limitation of function.      Posture:  decreased WB R LE.  Compression stockings R distal LE.    24:  recently stopped wearing compression stockings, some increased distal LE edema noted.      Gait/stairs:  decreased stance time, decreased hip ext and trunk rotation, min hesitation with R LE WB and step to pattern on stairs with B UE support with L LE WB.    24:  able to ambulate with straight cane indoors with good pattern, but slow angelo, up and down stairs with cane and rail ascending reciprocally and descending with B rails with reciprocal pattern with mod decreased motor control with descending with  R LE WB.       Balance:   R SLS:  30\"(was Unable)     ROM:  R  knee ext/flex:  A: -(was 85) after stretching:  A: -:  105(was 91)     MMT:   Hip flexors:  4/5(was 4-/5)  ext: supine leg press:  4/5(was 4-/5)  abd: 4/5(was 4-/5)  add: 3/5(was 3-/5)  Quad la(was " "15)  Hams: supine:  4/5(was 4-/5)  DF: 4+/5(was 4/5)  PF: (NWB):  4+/5(was 4/5)     Flexibility:    Hams:  90/90: -30(was -32)  Heelcords: 3(was 0)  Hip flexors:  mod (was mod/sev)     Palpation:  mod LE edema and scabbing over incision and some redness noted.      Treatment:   **= HEP  NV= Next visit  np= not performed  nb= non-billable  G= group HEP= discharged to HEP  Therapeutic Exercise:     53 minutes  Nu-step:  soft tissue warm up and subjective taken:  Lev 3 x 10'  Stair R  knee flex:  10/10\"**/ext stretch:  3/30\"**  R hip flexor/calf stretch:  3/30\"**  HS with belt:  10/10\" **  QS with towel roll under knee and heel:  15/5\" **  Re-assessment    Manual Therapy:       minutes  PROM into R knee ext/flex:  10\" x 5 ea. (Continue each visit until 0/120 ROM)    Modalities:       Cold Pack          10 Minutes  R knee supine with wedge elevation    Education:  Proper elevation and icing. Cross friction massage to scar/importance of regaining ROM ASAP   "

## 2024-08-22 ENCOUNTER — TREATMENT (OUTPATIENT)
Dept: PHYSICAL THERAPY | Facility: CLINIC | Age: 79
End: 2024-08-22
Payer: MEDICARE

## 2024-08-22 DIAGNOSIS — Z96.651 PRESENCE OF RIGHT ARTIFICIAL KNEE JOINT: ICD-10-CM

## 2024-08-22 PROCEDURE — 97112 NEUROMUSCULAR REEDUCATION: CPT | Mod: GP,CQ

## 2024-08-22 PROCEDURE — 97110 THERAPEUTIC EXERCISES: CPT | Mod: GP,CQ

## 2024-08-22 NOTE — PROGRESS NOTES
Physical Therapy  Physical Therapy Progress Note/re-assessment    Patient Name Edelmira Montesinos   MRN: [id number]  Today's Date: 8/22/2024  Time Calculation  Start Time: 1015  Stop Time: 1100  Time Calculation (min): 45 min    Insurance:    Visit #:   9   Insurance Reviewed  (per information provided by  pre-cert team)  [facility] certification date range:  7-26-24/10-23-24(sent 3 times)(signed)    Therapy Diagnoses:   Problem List Items Addressed This Visit             ICD-10-CM       Musculoskeletal and Injuries    Presence of right artificial knee joint Z96.651       General:  Reason for visit: s/p R TKR   Referred by: Jordan Shen MD appt:  none scheduled  Preferred Name:  Edelmira  Script:  PT  Onset Date:  6-19-24  Most recent assessment/re-assessment: 7-26-24/8-20-24  Email/phone #:  [email or web address]    Assessment:  Progressed patient with addition of lunges to increase knee flexion in WB with cues for equal weight distribution and maintaining heel contact of lead LE. Able to add Airex to partial squats to challenge balance and stability on uneven terrain.  Fatigued at end of session.    Patient needs continued work on/skilled PT for: ROM, functional and closed chain strength and gait quality to address remaining functional, objective and subjective deficits to allow them to return to prior /optimal level of function with ADLs.  Patient is progressing with goals: progressing with gait training  Skilled care:  exercise progression, manual, NMR, patient education      Plan:    Continue to progress per poc:   Assess lower LE edema next visit again to see if Tubigrip is necessary. Resume step downs and step overs next visit.    Subjective:   Patient reports:  she feels increased tightness in R. Knee when bending, and has been massaging it at home.   Have you fallen since last visit:  no    Precautions:  no fall risk  HTN, s// R TKR and L knee OA and will need L TKR.     Pain:  4/10  Location/Type of  "pain:  ant R knee pain    HEP compliance/understanding:  yes    Objective:   Objective Measurements:    Balance: 30\" SL balance on R. LE  Posture:  V/c to avoid reliance of UE support and forward trunk flexion as momentum initially with step up exercises.   ROM:  R. Knee flexion AROM 103 degrees post manual stretching  Gait:  Decreased toe off in terminal stance of each LE - cueing and demonstration by therapist    Treatment:   **= HEP  NV= Next visit  np= not performed  nb= non-billable  G= group HEP= discharged to [facility]  Therapeutic Exercise:     20 minutes  Nu-step:  soft tissue warm up and subjective taken:  Lev 3 x 10' (5' seat 7, 5' seat 6)  Stair R  knee flex:  10/10\"**/ext stretch:  3/30\"**  R hip flexor/calf stretch:  3/30\"**  HS with belt:  10/10\" **  QS with towel roll under knee and heel:  15/5\" **      Manual Therapy:     5 minutes  PROM into R knee ext/flex:  10\" x 5 ea. (Continue each visit until 0/120 ROM)      Neuromuscular Re-education:    20 minutes  R SLS:  x 3/30\" max**  Step ups for/lat: B UE support:  6\" x 25 ea.  Step downs:  6\" with B UE support x 10/NV  Airex Partial squats:  2 x 10  Alt lunges:  in parallel bars x 5 each LE leading    Modalities:    Declined this date   Cold Pack            Minutes  R knee supine with wedge elevation    Education:  Proper elevation and icing.  Frequency of flexion ex/stretching at home to increase AROM.  "

## 2024-08-26 ENCOUNTER — TREATMENT (OUTPATIENT)
Dept: PHYSICAL THERAPY | Facility: CLINIC | Age: 79
End: 2024-08-26
Payer: MEDICARE

## 2024-08-26 DIAGNOSIS — Z96.651 PRESENCE OF RIGHT ARTIFICIAL KNEE JOINT: ICD-10-CM

## 2024-08-26 PROCEDURE — 97112 NEUROMUSCULAR REEDUCATION: CPT | Mod: GP,CQ

## 2024-08-26 PROCEDURE — 97110 THERAPEUTIC EXERCISES: CPT | Mod: GP,CQ

## 2024-08-26 NOTE — PROGRESS NOTES
Physical Therapy  Physical Therapy Progress Note/re-assessment    Patient Name Edelmira Montesinos   MRN: [id number]  Today's Date: 8/26/2024  Time Calculation  Start Time: 1015  Stop Time: 1110  Time Calculation (min): 55 min    Insurance:    Visit #:   10   Insurance Reviewed  (per information provided by  pre-cert team)  [facility] certification date range:  7-26-24/10-23-24(sent 3 times)(signed)    Therapy Diagnoses:   Problem List Items Addressed This Visit             ICD-10-CM       Musculoskeletal and Injuries    Presence of right artificial knee joint Z96.651       General:  Reason for visit: s/p R TKR   Referred by: Jordan Shen MD appt:  none scheduled  Preferred Name:  Edelmira  Script:  PT  Onset Date:  6-19-24  Most recent assessment/re-assessment: 7-26-24/8-20-24  Email/phone #:  [email or web address]    Assessment:  Patient practiced gait over varying terrains outside on concrete and over carpet and tile inside with straight cane. V/c for proximity of straight cane.  Able to progress with marching on Airex to challenge balance.  Frequent cueing for sequencing with step overs.  Improved fluid movement with decreased UE reliance as reps progressed with step overs.  Fatigued at end of session.  Relief with CP.   Patient needs continued work on/skilled PT for: ROM, functional and closed chain strength and gait quality to address remaining functional, objective and subjective deficits to allow them to return to prior /optimal level of function with ADLs.  Patient is progressing with goals: progressing with gait training  Skilled care:  exercise progression, manual, NMR, patient education      Plan:    Continue to progress per poc:   Add low juana ambulation next visit.      Subjective:   Patient reports:  she has been practicing with the cane at home, but has not ventured outside with it and is concerned about navigating concrete. States she does not have pain at therapy, but it increases later at  "night to -8/10  Have you fallen since last visit:  no    Precautions:  no fall risk  HTN, s// R TKR and L knee OA and will need L TKR.     Pain:  4/10  Location/Type of pain:  ant R knee pain    HEP compliance/understanding:  yes    Objective:   Objective Measurements:    Posture:  V/c to avoid reliance of UE support and forward trunk flexion as momentum initially with step up exercises.   Gait:  progressing with cane ambulation over various terrain    Treatment:   **= HEP  NV= Next visit  np= not performed  nb= non-billable  G= group HEP= discharged to [facility]  Therapeutic Exercise:     20 minutes  Nu-step:  soft tissue warm up and subjective taken:  Lev 3 x 10' (5' seat 7, 5' seat 6)  Stair R  knee flex:  10/10\"**/ext stretch:  3/30\"**  R hip flexor/calf stretch:  3/30\"**  HS with belt:  10/10\" **  QS with towel roll under knee and heel:  15/5\" **      Manual Therapy:     5 minutes  PROM into R knee ext/flex:  10\" x 5 ea. (Continue each visit until 0/120 ROM)      Neuromuscular Re-education:    15 minutes  R SLS:  x 3/30\" max**  Step ups for/lat: B UE support:  6\" x 25 ea.  Step downs:  6\" with B UE support x 10/NV  Airex Partial squats:  2 x 10  Alt lunges:  in parallel bars x 5 each LE leading/NV  Step overs 6\"x 15  Airex - marching x 20      Gait Trainin minutes  Ambulating outside on concrete with straight cane       Modalities:    Declined this date   Cold Pack          10 Minutes  R knee seated    Education:  V/c for correct technique, posture, pacing, sequencing, and CARLITA with exercises.    Issued tubigrip sizes E and F and instructed in use during the day for edema control.   "

## 2024-08-28 ENCOUNTER — TREATMENT (OUTPATIENT)
Dept: PHYSICAL THERAPY | Facility: CLINIC | Age: 79
End: 2024-08-28
Payer: MEDICARE

## 2024-08-28 DIAGNOSIS — Z96.651 PRESENCE OF RIGHT ARTIFICIAL KNEE JOINT: ICD-10-CM

## 2024-08-28 PROCEDURE — 97110 THERAPEUTIC EXERCISES: CPT | Mod: GP,CQ

## 2024-08-28 PROCEDURE — 97112 NEUROMUSCULAR REEDUCATION: CPT | Mod: GP,CQ

## 2024-08-28 NOTE — PROGRESS NOTES
Physical Therapy  Physical Therapy Progress Note/re-assessment    Patient Name Edelmira Montesinos   MRN: [id number]  Today's Date: 8/28/2024  Time Calculation  Start Time: 1015  Stop Time: 1123  Time Calculation (min): 68 min    Insurance:    Visit #:   11   Insurance Reviewed  (per information provided by  pre-cert team)  [facility] certification date range:  7-26-24/10-23-24(sent 3 times)(signed)    Therapy Diagnoses:   Problem List Items Addressed This Visit             ICD-10-CM       Musculoskeletal and Injuries    Presence of right artificial knee joint Z96.651       General:  Reason for visit: s/p R TKR   Referred by: Jordan Shen MD appt:  none scheduled  Preferred Name:  Edelmira  Script:  PT  Onset Date:  6-19-24  Most recent assessment/re-assessment: 7-26-24/8-20-24  Email/phone #:  [email or web address]    Assessment:  Increased pain at end range of flexion with manual during heel slides.  No pain with manual overpressure during quad sets.  Progressed patient with juana amb to increase hip and knee flexion with obstacles and challenge balance in SLS.  V/c for L. LE to avoid circumduction initially, but able to clear obstacles with surgical limb with slow angelo.  Advanced SLS to Airex to challenge balance on uneven terrain.  Quality reciprocal gait with stair negotiation, slowly with bladimir UE support at handrails.    Patient needs continued work on/skilled PT for: ROM, functional and closed chain strength and gait quality to address remaining functional, objective and subjective deficits to allow them to return to prior /optimal level of function with ADLs.  Patient is progressing with goals: progressing with gait training  Skilled care:  exercise progression, manual, NMR, patient education, gait training    Plan:    Continue to progress per poc:   Progress HEP NV.     Subjective:   Patient reports: she is going to work on practicing with the cane over the weekend.  States she is concerned about L.  "Knee that needs a TKA as well.  Decreased swelling and improved support in WB since donning Tubigrip, issued last visit.   Have you fallen since last visit:  no    Precautions:  no fall risk  HTN, s// R TKR and L knee OA and will need L TKR.     Pain:  4/10  Location/Type of pain:  ant R knee pain    HEP compliance/understanding:  yes    Objective:   Objective Measurements:    Gait:  Ambulated training stairs in gym x 2 trials with SBA and reciprocal gait pattern.   ROM:  R. Knee AROM (105 degrees), AAROM (107 degrees) post stretching/manual at end of session.    Treatment:   **= HEP  NV= Next visit  np= not performed  nb= non-billable  G= group HEP= discharged to [facility]  Therapeutic Exercise:     20 minutes  Nu-step:  soft tissue warm up and subjective taken:  Lev 3 x 10' (5' seat 7, 5' seat 6)  Stair R  knee flex:  10/10\"**/ext stretch:  3/30\"**  R hip flexor/calf stretch:  3/30\"**  HS with belt:  10/10\" **  QS with towel roll under knee and heel:  15/5\" **      Manual Therapy:     5 minutes  PROM into R knee ext/flex:  10\" x 5 ea. (Continue each visit until 0/120 ROM)      Neuromuscular Re-education:    25 minutes  Airex - R SLS:  x 3/12\" max**  Step ups for/lat: B UE support:  6\" x 25 ea.  Step downs:  6\" with B UE support x 10/NV  Airex Partial squats:  2 x 10  Alt lunges:  in parallel bars x 5 each LE leading/NV  Step overs 6\"x 15  Airex - marching x 20  Stair negotiation x 2 trials ascending/descending x 4 steps with SBA  High juana amb lateral x 3 hurdles x 4 laps  w/CGA + gait belt and cane + rail  High juana amb forward x 3 hurdles x 4 laps reciprocally leading with R. LE w/CGA + gait belt and cane + rail      Gait Trainin minutes  Ambulating outside on concrete with straight cane  w/CGA + gait belt and cane - attempted one curb step down and up with cane +HHA - difficult      Modalities:       Cold Pack     10       Minutes  R knee seated    Education:  V/c for increasing toe off in terminal " stance.  V/c for posture to avoid looking at floor with exercises.  Issued longer size F tubigrip.      HEP:    Access Code: [email or web address]  URL: [email or web address]  Date: 08/01/2024  Prepared by: Dali Ngo     Exercises  - Standing Knee Flexion Stretch on Step  - 1 x daily - 7 x weekly - 1 sets - 10 reps - 10 second hold  - Standing Hamstring Stretch on Chair  - 1-2 x daily - 7 x weekly - 1 sets - 3 reps - 30 second hold  - Standing Hip Flexor Stretch with Chair  - 1-2 x daily - 7 x weekly - 1 sets - 3 reps - 30 second hold  - Single Leg Stance  - 1 x daily - 7 x weekly - 1 sets - 5-10 reps - up to 30 second hold  - Standing Terminal Knee Extension with Resistance  - 1 x daily - 7 x weekly - 2 sets - 10 reps - 5 second hold  - Mini Squat with Counter Support  - 1 x daily - 7 x weekly - 2 sets - 10 reps

## 2024-09-03 ENCOUNTER — TREATMENT (OUTPATIENT)
Dept: PHYSICAL THERAPY | Facility: CLINIC | Age: 79
End: 2024-09-03
Payer: MEDICARE

## 2024-09-03 DIAGNOSIS — Z96.651 PRESENCE OF RIGHT ARTIFICIAL KNEE JOINT: ICD-10-CM

## 2024-09-03 PROCEDURE — 97110 THERAPEUTIC EXERCISES: CPT | Mod: GP | Performed by: PHYSICAL THERAPIST

## 2024-09-03 NOTE — PROGRESS NOTES
"Physical Therapy  Physical Therapy Progress Note/re-assessment    Patient Name Edelmira Montesinos   MRN: [id number]  Today's Date: 9/3/2024  Time Calculation  Start Time: 1030  Stop Time: 1115  Time Calculation (min): 45 min    Insurance:    Visit #:   12   Insurance Reviewed  (per information provided by  pre-cert team)  [facility] certification date range:  7-26-24/10-23-24(sent 3 times)(signed)    Therapy Diagnoses:   Problem List Items Addressed This Visit             ICD-10-CM    Presence of right artificial knee joint Z96.651    Relevant Orders    Follow Up In Physical Therapy     General:  Reason for visit: s/p R TKR   Referred by: Jordan Shen MD appt:  none scheduled  Preferred Name:  Edelmira  Script:  PT  Onset Date:  6-19-24  Most recent assessment/re-assessment: 7-26-24/8-20-24/9-3-24  Email/phone #:  [email or web address]    Assessment:  Skilled care:  re-assessment  STG:  In two weeks.   -Increased postural awareness.  Met  -Compliant with HEP Met  LTG: by discharge  -Increased postural awareness and posture WFL.  Progressing  -Increased function with ADL's and IADL's.  Improve LEFS to:  20 %  limitation of function. Progressing  -Normal gait pattern  on level and uneven surfaces community level distances for improved function in the community.  Progressing  -Normal reciprocal pattern on stairs for improved function to all levels of home.  Progressing  -Increase  R SLS to 15\".  Met   -Increase R knee AROM to 0/120 for improved function with car transfers. Progressing  -Increase R LE strength to WNL for improved function with chores and volunteer duties. Progressing  -Increase R LE flexibility to WFL.  Progressing  -I and compliant with HEP and proper: R LE care.  Progressing    Plan:    Continue to progress per poc:   NV continued work on ROM and proper gait and pattern on stairs as well as closed chain strength.      Subjective:   Patient reports:  that she is trying to walk more with the cane.  " "Her L knee is getting more painful and the surgical R knee is feeling better.  She will probably wait until the spring to have  the L TKR done.      Have you fallen since last visit:  no    Precautions:   no fall risk  HTN, s// R TKR and L knee OA and will need L TKR.     Pain:  1-2/10  Location/Type of pain:  discomfort R knee    HEP compliance/understanding:  yes    Objective:   Objective Measurements:    Sleep: up to the bathroom, supine, instructed in proper postures.  ADL's:  I with dressing and showering, using walker in the shower.  No change  Chores:  doing more cooking and dishes, (was some light chores, would like to resume yard work and gardening. )   Driving: hopes to resume driving, has not for one year due to HTN issues.  Better (was Slow) with transfers.    Work:  housewife.    Recreation: has been able to do more (was wants to resume activities around the house and with family.)       Objective:    Outcome Measures:  Other Measures  Lower Extremity Funtional Score (LEFS): 37/43%(was 44/33%)(was 30 53%) limitation of function.      Posture:  decreased WB R LE.  Compression stockings R distal LE.    8-20-24:  recently stopped wearing compression stockings, some increased distal LE edema noted.      Gait/stairs:  decreased stance time, decreased hip ext and trunk rotation, min hesitation with R LE WB and step to pattern on stairs with B UE support with L LE WB.    8-20-24:  able to ambulate with straight cane indoors with good pattern, but slow angelo, up and down stairs with cane and rail ascending reciprocally and descending with B rails with reciprocal pattern with mod decreased motor control with descending with  R LE WB.    9-3-24:  ambulating with straight cane indoors and outdoors with good pattern with R LE, some cues to flex L knee at swing through, reciprocal pattern on stairs with B rail support with good pattern with R LE WB.       Balance:   R SLS:  30\"(was Unable)     ROM:  R  knee " "ext/flex:  A: -(was 85) after stretching:  A: -8 :  105(was 91)     MMT:   Hip flexors:  4/5(was 4-/5)  ext: supine leg press:  4/5(was 4-/5)  abd: 4/5(was 4-/5)  add: 3/5(was 3-/5)  Quad la(was 8)(was 15)  Hams: supine:  4/5(was 4-/5)  DF: 4+/5(was 4/5)  PF: (NWB):  4+/5(was 4/5)     Flexibility:    Hams:  90/90: -22(was -30)(was -32)  Heelcords: 3(was 0)  Hip flexors:  mod (was mod/sev)     Palpation:  mod LE edema and scabbing over incision and some redness noted.    9-3-24:  Incision well healed.        Treatment:   **= HEP  NV= Next visit  np= not performed  nb= non-billable  G= group HEP= discharged to [facility]  Therapeutic Exercise:     45 minutes  Nu-step:  soft tissue warm up and subjective taken:  Lev 3 x 10'  Stair R  knee flex:  10/10\"**/ext stretch:  3/30\"**  R hip flexor/calf stretch:  3/30\"**  Re-assessment    Education:  poc    "

## 2024-09-06 ENCOUNTER — TREATMENT (OUTPATIENT)
Dept: PHYSICAL THERAPY | Facility: CLINIC | Age: 79
End: 2024-09-06
Payer: MEDICARE

## 2024-09-06 DIAGNOSIS — Z96.651 PRESENCE OF RIGHT ARTIFICIAL KNEE JOINT: ICD-10-CM

## 2024-09-06 PROCEDURE — 97110 THERAPEUTIC EXERCISES: CPT | Mod: GP | Performed by: PHYSICAL THERAPIST

## 2024-09-06 PROCEDURE — 97112 NEUROMUSCULAR REEDUCATION: CPT | Mod: GP | Performed by: PHYSICAL THERAPIST

## 2024-09-06 NOTE — PROGRESS NOTES
Physical Therapy  Physical Therapy Progress Note    Patient Name Edelmira Montesinos   MRN: [id number]  Today's Date: 9/6/2024  Time Calculation  Start Time: 1130  Stop Time: 1230  Time Calculation (min): 60 min    Insurance:    Visit #:   13   Insurance Reviewed  (per information provided by  pre-cert team)  [facility] certification date range:  7-26-24/10-23-24(sent 3 times)(signed)    Therapy Diagnoses:   Problem List Items Addressed This Visit             ICD-10-CM    Presence of right artificial knee joint Z96.651     General:  Reason for visit: s/p R TKR   Referred by: Jordan Shen MD appt:  none scheduled  Preferred Name:  Edelmira  Script:  PT  Onset Date:  6-19-24  Most recent assessment/re-assessment: 7-26-24/8-20-24/9-3-24  Email/phone #:  [email or web address]  Access Code: [email or web address]  Issued Size E tubigrip for R LE    Assessment:  Patient tolerated treatment well, did well with progression this date.    Patient needs continued work on/skilled PT for: ROM and functional strength to address remaining functional, objective and subjective deficits to allow them to return to prior /optimal level of function with ADLs.  Patient is progressing with goals: improving ROM  Skilled care:  exercise progression, manual    Plan:    Continue to progress per poc:   NV add progression of closed chain and gait activities, continue to challenge balance.     Subjective:   Patient reports:  that the tubigrip has stretched out and is not helping much anymore    Have you fallen since last visit:  no    Precautions:  no fall risk  HTN, s// R TKR and L knee OA and will need L TKR.     Pain:  2/10  Location/Type of pain:  stiffness and swelling R knee joint, some L knee pain too due to severe OA.    HEP compliance/understanding:  yes    Objective:   Objective Measurements:    ROM:  R knee ext/flex: A: -4 /AA:  112      Treatment:   **= HEP  NV= Next visit  np= not performed  nb= non-billable  G= group HEP= discharged to  "HEP  Therapeutic Exercise:     25 minutes  Nu-step:  soft tissue warm up and subjective taken:  Lev 3 x 10'  Stair R  knee flex:  10/10\"**/ext stretch:  3/30\"**  R hip flexor/calf stretch:  3/30\"*  HS with belt:  10/10\"**  QS with towel roll under heel/knee:  15/5\"**    Manual Therapy:       minutes  Overpressure with  QS:    Issued Size E tubigrip for R LE    Neuromuscular Re-education:    10 minutes  R SLS:  x 5/10\" max  Step ups for/lat: B UE support:  6\" x 25 ea.  Step downs:  6\" with B UE support x 15  Airex for/retro/lateral step overs:  NV    Gait Trainin minutes  Worked on proper heel toe gait pattern    Modalities:       Cold Pack          10 Minutes  CP to R knee supine with bolster:  x 10'    Education:  exercise progression, proper compression and gait training.  HEP:    Access Code: [email or web address]  URL: [email or web address]  Date: 2024  Prepared by: Dali Ngo     Exercises  - Standing Knee Flexion Stretch on Step  - 1 x daily - 7 x weekly - 1 sets - 10 reps - 10 second hold  - Standing Hamstring Stretch on Chair  - 1-2 x daily - 7 x weekly - 1 sets - 3 reps - 30 second hold  - Standing Hip Flexor Stretch with Chair  - 1-2 x daily - 7 x weekly - 1 sets - 3 reps - 30 second hold  - Single Leg Stance  - 1 x daily - 7 x weekly - 1 sets - 5-10 reps - up to 30 second hold  - Standing Terminal Knee Extension with Resistance  - 1 x daily - 7 x weekly - 2 sets - 10 reps - 5 second hold  - Mini Squat with Counter Support  - 1 x daily - 7 x weekly - 2 sets - 10 reps  "

## 2024-09-11 ENCOUNTER — TREATMENT (OUTPATIENT)
Dept: PHYSICAL THERAPY | Facility: CLINIC | Age: 79
End: 2024-09-11
Payer: MEDICARE

## 2024-09-11 DIAGNOSIS — Z96.651 PRESENCE OF RIGHT ARTIFICIAL KNEE JOINT: ICD-10-CM

## 2024-09-11 PROCEDURE — 97112 NEUROMUSCULAR REEDUCATION: CPT | Mod: GP,CQ

## 2024-09-11 PROCEDURE — 97110 THERAPEUTIC EXERCISES: CPT | Mod: GP,CQ

## 2024-09-11 NOTE — PROGRESS NOTES
Physical Therapy  Physical Therapy Progress Note    Patient Name Edelmira Montesinos   MRN: [id number]  Today's Date: 9/11/2024  Time Calculation  Start Time: 1010  Stop Time: 1110  Time Calculation (min): 60 min    Insurance:    Visit #:   14   Insurance Reviewed  (per information provided by  pre-cert team)  [facility] certification date range:  7-26-24/10-23-24(sent 3 times)(signed)    Therapy Diagnoses:   Problem List Items Addressed This Visit             ICD-10-CM       Musculoskeletal and Injuries    Presence of right artificial knee joint Z96.651     General:  Reason for visit: s/p R TKR   Referred by: Jordan Shen MD appt:  none scheduled  Preferred Name:  Edelmira  Script:  PT  Onset Date:  6-19-24  Most recent assessment/re-assessment: 7-26-24/8-20-24/9-3-24  Email/phone #:  [email or web address]  Access Code: [email or web address]  Issued Size E tubigrip for R LE    Assessment:  Progressed patient with Airex step overs this date.  Patient performs adequate technique with slow and cautious pacing.  Able to perform step downs with improved technique and decreased reliance of UE support.  Fatigued at end of session.  Relief with CP.   Patient needs continued work on/skilled PT for: ROM and functional strength to address remaining functional, objective and subjective deficits to allow them to return to prior /optimal level of function with ADLs.  Patient is progressing with goals: improving ROM  Skilled care:  exercise progression, manual    Plan:    Continue to progress per poc:   Resume juana amb to assist gait quality next visit.     Subjective:   Patient reports she feels as if a nerve is irritated along lateral L. LE.      Have you fallen since last visit:  no    Precautions:  no fall risk  HTN, s// R TKR and L knee OA and will need L TKR.     Pain:  2/10  Location/Type of pain:  stiffness and swelling R knee joint, some L knee pain too due to severe OA.    HEP compliance/understanding:  yes    Objective:  "  Objective Measurements:    ROM:  R knee ext/flex: A: -3 /AA:  111 post manual  Gait:  v/c for increasing toe off in terminal stance    Treatment:   **= HEP  NV= Next visit  np= not performed  nb= non-billable  G= group HEP= discharged to [facility]  Therapeutic Exercise:     20 minutes  Nu-step:  soft tissue warm up and subjective taken:  Lev 4 x 10', seat 6  Stair R  knee flex:  10/10\"**/ext stretch:  3/30\"**  R hip flexor/calf stretch:  3/30\"*  HS with belt:  10/10\"**  QS with towel roll under heel/knee:  15/5\"**    Manual Therapy:     5 minutes  Overpressure with  QS:  5\" hold x 10  Heel slides 5\" hold x 10    Neuromuscular Re-education:    25 minutes  R SLS:  x 3/30\" max  Step ups for/lat: B UE support:  6\" x 25 ea.  Step downs:  6\" with B UE support x 15  Airex for/retro step overs:  x 15 L. LE SLS,   Airex - lateral step overs x 10 bladimir  Airex - mini marching x 20    Gait Training:            minutes  Worked on proper heel toe gait pattern/NP    Modalities:       Cold Pack            Minutes  CP to R knee supine with bolster:  x 10'    Education:  exercise progression, proper compression and gait training.  HEP:    Access Code: [email or web address]  URL: [email or web address]  Date: 08/01/2024  Prepared by: Dali Ngo     Exercises  - Standing Knee Flexion Stretch on Step  - 1 x daily - 7 x weekly - 1 sets - 10 reps - 10 second hold  - Standing Hamstring Stretch on Chair  - 1-2 x daily - 7 x weekly - 1 sets - 3 reps - 30 second hold  - Standing Hip Flexor Stretch with Chair  - 1-2 x daily - 7 x weekly - 1 sets - 3 reps - 30 second hold  - Single Leg Stance  - 1 x daily - 7 x weekly - 1 sets - 5-10 reps - up to 30 second hold  - Standing Terminal Knee Extension with Resistance  - 1 x daily - 7 x weekly - 2 sets - 10 reps - 5 second hold  - Mini Squat with Counter Support  - 1 x daily - 7 x weekly - 2 sets - 10 reps  "

## 2024-09-12 ENCOUNTER — APPOINTMENT (OUTPATIENT)
Dept: PHYSICAL THERAPY | Facility: CLINIC | Age: 79
End: 2024-09-12
Payer: MEDICARE

## 2024-09-16 ENCOUNTER — APPOINTMENT (OUTPATIENT)
Dept: PHYSICAL THERAPY | Facility: CLINIC | Age: 79
End: 2024-09-16
Payer: MEDICARE

## 2024-09-16 ENCOUNTER — DOCUMENTATION (OUTPATIENT)
Dept: PHYSICAL THERAPY | Facility: CLINIC | Age: 79
End: 2024-09-16
Payer: MEDICARE

## 2024-09-16 NOTE — PROGRESS NOTES
Physical Therapy                 Therapy Communication Note    Patient Name: Edelmira Montesinos  MRN: [id number]  Department:   Room: Room/bed info not found  Today's Date: 9/16/2024     Discipline: Physical Therapy    Missed Visit Reason:      Missed Time: Cancel    Comment:  Dizzy and not feeling well.

## 2024-09-18 ENCOUNTER — TELEPHONE (OUTPATIENT)
Dept: CARDIOLOGY | Facility: CLINIC | Age: 79
End: 2024-09-18
Payer: MEDICARE

## 2024-09-18 NOTE — TELEPHONE ENCOUNTER
Patient states she is on losartan and metoprolol and states that she is constipated and gets very drowsy until she has a bowl movement.

## 2024-09-19 ENCOUNTER — TREATMENT (OUTPATIENT)
Dept: PHYSICAL THERAPY | Facility: CLINIC | Age: 79
End: 2024-09-19
Payer: MEDICARE

## 2024-09-19 DIAGNOSIS — Z96.651 PRESENCE OF RIGHT ARTIFICIAL KNEE JOINT: ICD-10-CM

## 2024-09-19 PROCEDURE — 97112 NEUROMUSCULAR REEDUCATION: CPT | Mod: GP,CQ

## 2024-09-19 PROCEDURE — 97140 MANUAL THERAPY 1/> REGIONS: CPT | Mod: GP,CQ

## 2024-09-19 PROCEDURE — 97110 THERAPEUTIC EXERCISES: CPT | Mod: GP,CQ

## 2024-09-19 NOTE — PROGRESS NOTES
Physical Therapy  Physical Therapy Progress Note    Patient Name Edelmira Montesinos   MRN: [id number]  Today's Date: 9/19/2024  Time Calculation  Start Time: 1015  Stop Time: 1110  Time Calculation (min): 55 min    Insurance:    Visit #:   15   Insurance Reviewed  (per information provided by  pre-cert team)  [facility] certification date range:  7-26-24/10-23-24(sent 3 times)(signed)    Therapy Diagnoses:   Problem List Items Addressed This Visit             ICD-10-CM       Musculoskeletal and Injuries    Presence of right artificial knee joint Z96.651     General:  Reason for visit: s/p R TKR   Referred by: Jordan Shen MD appt:  none scheduled  Preferred Name:  Edelmira  Script:  PT  Onset Date:  6-19-24  Most recent assessment/re-assessment: 7-26-24/8-20-24/9-3-24  Email/phone #:  [email or web address]  Access Code: [email or web address]  Issued Size E tubigrip for R LE    Assessment:  Improved weight shifting and angelo with Airex step overs.  Demonstrated adequate control with step ups in both forward and lateral directions.  Improved motion after manual interventions this date.  Fatigued at end of session. Relief with CP.   Patient needs continued work on/skilled PT for: ROM and functional strength to address remaining functional, objective and subjective deficits to allow them to return to prior /optimal level of function with ADLs.  Patient is progressing with goals: improving ROM  Skilled care:  exercise progression, manual    Plan:    Continue to progress per poc:   Resume juana amb and step downs net visit.     Subjective:   Patient reports tightness in R. Knee.  Ambulates into PT this date with ww secondary to dizziness felt the past couple of days.  Patient cancelled on Monday secondary to dizziness and did contact her physician who wants her to take her medications in a different order which she trialled yesterday, but not today.      Have you fallen since last visit:  no    Precautions:  no fall risk  HTN,  "s// R TKR and L knee OA and will need L TKR.     Pain:  2010  Location/Type of pain:  stiffness and swelling R knee joint, some L knee pain too due to severe OA.    HEP compliance/understanding:  yes    Objective:   Objective Measurements:    ROM:  R knee ext/flex: A: -1 /AA:  115 post manual  Gait:  v/c for increasing toe off in terminal stance    Treatment:   **= HEP  NV= Next visit  np= not performed  nb= non-billable  G= group HEP= discharged to [facility]  Therapeutic Exercise:     20 minutes  Nu-step:  soft tissue warm up and subjective taken:  Lev 4 x 10', seat 6  Stair R  knee flex:  10/10\"**/ext stretch:  3/30\"**  R hip flexor/calf stretch:  3/30\"*  HS with belt:  10/10\"**  QS with towel roll under heel/knee:  15/5\"**    Manual Therapy:     7 minutes  Overpressure with  QS:  5\" hold x 10  Heel slides 5\" hold x 10    Neuromuscular Re-education:    18 minutes  R SLS:  x 3/30\" max  Step ups for/lat: B UE support:  6\" x 25 ea.  Step downs:  6\" with B UE support x 15/NV  Airex for/retro step overs:  x 15 L. LE SLS,   Airex - lateral step overs x 10 bladimir  Airex - mini marching x 20    Gait Training:            minutes  Worked on proper heel toe gait pattern/NP    Modalities:       Cold Pack            Minutes  CP to R knee supine with bolster:  x 10'    Education: V/c for avoiding looking at floor with step ups.   HEP:    Access Code: [email or web address]  URL: [email or web address]  Date: 08/01/2024  Prepared by: Dali Ngo     Exercises  - Standing Knee Flexion Stretch on Step  - 1 x daily - 7 x weekly - 1 sets - 10 reps - 10 second hold  - Standing Hamstring Stretch on Chair  - 1-2 x daily - 7 x weekly - 1 sets - 3 reps - 30 second hold  - Standing Hip Flexor Stretch with Chair  - 1-2 x daily - 7 x weekly - 1 sets - 3 reps - 30 second hold  - Single Leg Stance  - 1 x daily - 7 x weekly - 1 sets - 5-10 reps - up to 30 second hold  - Standing Terminal Knee Extension with Resistance  - 1 x daily - 7 x " weekly - 2 sets - 10 reps - 5 second hold  - Mini Squat with Counter Support  - 1 x daily - 7 x weekly - 2 sets - 10 reps

## 2024-09-23 ENCOUNTER — TREATMENT (OUTPATIENT)
Dept: PHYSICAL THERAPY | Facility: CLINIC | Age: 79
End: 2024-09-23
Payer: MEDICARE

## 2024-09-23 DIAGNOSIS — Z96.651 PRESENCE OF RIGHT ARTIFICIAL KNEE JOINT: ICD-10-CM

## 2024-09-23 PROCEDURE — 97112 NEUROMUSCULAR REEDUCATION: CPT | Mod: GP,CQ

## 2024-09-23 PROCEDURE — 97110 THERAPEUTIC EXERCISES: CPT | Mod: GP,CQ

## 2024-09-23 NOTE — PROGRESS NOTES
Physical Therapy  Physical Therapy Progress Note    Patient Name Edelmira Montesinos   MRN: [id number]  Today's Date: 9/23/2024  Time Calculation  Start Time: 1015  Stop Time: 1110  Time Calculation (min): 55 min    Insurance:    Visit #:   16   Insurance Reviewed  (per information provided by  pre-cert team)  [facility] certification date range:  7-26-24/10-23-24(sent 3 times)(signed)    Therapy Diagnoses:   Problem List Items Addressed This Visit             ICD-10-CM       Musculoskeletal and Injuries    Presence of right artificial knee joint Z96.651     General:  Reason for visit: s/p R TKR   Referred by: Jordan Shen MD appt:  none scheduled  Preferred Name:  Edelmira  Script:  PT  Onset Date:  6-19-24  Most recent assessment/re-assessment: 7-26-24/8-20-24/9-3-24  Email/phone #:  [email or web address]  Access Code: [email or web address]  Issued Size E tubigrip for R LE    Assessment:  Patient challenged with Airex SL balance,  Improved sequencing with juana amb utilizing reciprocal pattern, when able to with non-surgical LE.  Displays control with eccentric loading on R. LE with step downs.  Challenged with strengthening.  Increased soreness with manual this date.  Fatigued at end of session.    Patient needs continued work on/skilled PT for: ROM and functional strength to address remaining functional, objective and subjective deficits to allow them to return to prior /optimal level of function with ADLs.  Patient is progressing with goals: improving ROM  Skilled care:  exercise progression, manual    Plan:    Continue to progress per poc:   Focus on gait activities, balance, and strength to increase functional task tolerance.      Subjective:   The patient reports she is very sore from going up and down her stairs multiple times a day over the weekend.  She is looking to have the L. Knee TKA in March.      Have you fallen since last visit:  no    Precautions:  no fall risk  HTN, s// R TKR and L knee OA and will need L  "TKR.     Pain:  4/10  Location/Type of pain:  stiffness and swelling R knee joint, some L knee pain too due to severe OA.    HEP compliance/understanding:  yes    Objective:   Objective Measurements:    ROM:  R knee ext/flex: A: -1 /AA:  111 post manual  Gait:  v/c for increasing toe off in terminal stance    Treatment:   **= HEP  NV= Next visit  np= not performed  nb= non-billable  G= group HEP= discharged to [facility]  Therapeutic Exercise:     30 minutes  Nu-step:  soft tissue warm up and subjective taken:  Lev 4 x 10', seat 6  Stair R  knee flex:  10/10\"**/ext stretch:  3/30\"**  R hip flexor/calf stretch:  3/30\"*  HS with belt:  10/10\"**  QS with towel roll under heel/knee:  15/5\"**  GTB hamstring curls 2 x 10 R. LE  LAQ 2# 2 x 10 R. LE    Manual Therapy:     5 minutes  Overpressure with  QS:  5\" hold x 10  Heel slides 5\" hold x 10    Neuromuscular Re-education:    20 minutes  Airex - R. LE SLS 7\" max x 5 trials  Step ups for/lat: B UE support:  6\" x 25 ea./NV  Step downs:  6\" with B UE support x 15  Airex for/retro step overs:  x 15 R LE SLS,   Airex - lateral step overs x 10 bladimir/NV  Airex - mini marching x 20  High juana amb laterally x 4 hurdles x 2 laps  High juana amb forward x 4 hurdles x 2 laps    Gait Training:            minutes  Worked on proper heel toe gait pattern/NP    Modalities:       Cold Pack            Minutes/NP      Education: V/c for avoiding looking at floor with step ups and avoiding heavy reliance on UE support with step ex.  Max t/c for correct technique and sequencing with ex program.      HEP:    Access Code: [email or web address]  URL: [email or web address]  Date: 08/01/2024  Prepared by: Dali Ngo     Exercises  - Standing Knee Flexion Stretch on Step  - 1 x daily - 7 x weekly - 1 sets - 10 reps - 10 second hold  - Standing Hamstring Stretch on Chair  - 1-2 x daily - 7 x weekly - 1 sets - 3 reps - 30 second hold  - Standing Hip Flexor Stretch with Chair  - 1-2 x daily - 7 " x weekly - 1 sets - 3 reps - 30 second hold  - Single Leg Stance  - 1 x daily - 7 x weekly - 1 sets - 5-10 reps - up to 30 second hold  - Standing Terminal Knee Extension with Resistance  - 1 x daily - 7 x weekly - 2 sets - 10 reps - 5 second hold  - Mini Squat with Counter Support  - 1 x daily - 7 x weekly - 2 sets - 10 reps

## 2024-09-25 ENCOUNTER — TREATMENT (OUTPATIENT)
Dept: PHYSICAL THERAPY | Facility: CLINIC | Age: 79
End: 2024-09-25
Payer: MEDICARE

## 2024-09-25 DIAGNOSIS — Z96.651 PRESENCE OF RIGHT ARTIFICIAL KNEE JOINT: ICD-10-CM

## 2024-09-25 PROCEDURE — 97112 NEUROMUSCULAR REEDUCATION: CPT | Mod: GP | Performed by: PHYSICAL THERAPIST

## 2024-09-25 PROCEDURE — 97110 THERAPEUTIC EXERCISES: CPT | Mod: GP | Performed by: PHYSICAL THERAPIST

## 2024-09-25 NOTE — PROGRESS NOTES
Physical Therapy  Physical Therapy Progress Note    Patient Name Edelmira Montesinos   MRN: [id number]  Today's Date: 9/25/2024  Time Calculation  Start Time: 0845  Stop Time: 0945  Time Calculation (min): 60 min    Insurance:    Visit #:   17   Insurance Reviewed  (per information provided by  pre-cert team)  [facility] certification date range:  7-26-24/10-23-24(sent 3 times)(signed)    Therapy Diagnoses:   Problem List Items Addressed This Visit             ICD-10-CM    Presence of right artificial knee joint Z96.651     General:  Reason for visit: s/p R TKR   Referred by: Jordan Shen MD appt:  none scheduled  Preferred Name:  Edelmira  Script:  PT  Onset Date:  6-19-24  Most recent assessment/re-assessment: 7-26-24/8-20-24/9-3-24  Email/phone #:  [email or web address]  Access Code: [email or web address]  Issued Size E tubigrip for R LE  Assessment:  Patient tolerated treatment well, did well with progression this date.    Patient needs continued work on/skilled PT for: end ROM and closed chain strength to address remaining functional, objective and subjective deficits to allow them to return to prior /optimal level of function with ADLs.  Patient is progressing with goals: improving ROM  Skilled care:  exercise progression, ROM measurements    Plan:    Continue to progress per poc:   NV decrease to 1 x/week and continue to focus on end ROM and stretching and closed chain strength.      Subjective:   Patient reports:  that she would like to decrease to once per week due to soreness in the L knee and the R hip after PT.  Patient hopes to have L TKR in 3-25.      Have you fallen since last visit:  no    Precautions:  no fall risk  HTN, s// R TKR and L knee OA and will need L TKR.     Pain:  2/10  Location/Type of pain:  generalized R knee stiffness/soreness    HEP compliance/understanding:  yes    Objective:   Objective Measurements:    Gait:  patient is walking with ww due to dizziness issues with meds. She is seeing MD in  "October and is trying some medication adjustments.    Balance:  R SLS:  22\"  ROM:   R knee ext/flexion:  A: 0 AA:  114    Treatment:   **= HEP progression today NV= Next visit  np= not performed  nb= non-billable  G= group HEP= discharged to [facility]  Therapeutic Exercise:     25 minutes  Nu-step:  soft tissue warm up and subjective taken:  Lev 4 x 10', seat 6  Stair R  knee flex:  10/10\"**/ext stretch:  3/30\"**  R hip flexor/calf stretch:  3/30\"*  HS with belt:  10/10\"**  QS with towel roll under heel/knee:  15/5\"**    Neuromuscular Re-education:    15 Minutes    R SLS:  x 5/22\" max  Step ups for:  8\" x 10 /lat: B UE support:  6\" x 15  Step downs:  6\" with B UE support x 15      Modalities:                                           Cold Pack                             Minutes  Seated to R knee x 10'  Education:  exercise progression  HEP:    Access Code: [email or web address]  URL: [email or web address]  Date: 08/01/2024  Prepared by: Dali Ngo     Exercises  - Standing Knee Flexion Stretch on Step  - 1 x daily - 7 x weekly - 1 sets - 10 reps - 10 second hold  - Standing Hamstring Stretch on Chair  - 1-2 x daily - 7 x weekly - 1 sets - 3 reps - 30 second hold  - Standing Hip Flexor Stretch with Chair  - 1-2 x daily - 7 x weekly - 1 sets - 3 reps - 30 second hold  - Single Leg Stance  - 1 x daily - 7 x weekly - 1 sets - 5-10 reps - up to 30 second hold  - Standing Terminal Knee Extension with Resistance  - 1 x daily - 7 x weekly - 2 sets - 10 reps - 5 second hold  - Mini Squat with Counter Support  - 1 x daily - 7 x weekly - 2 sets - 10 reps  "

## 2024-09-27 ENCOUNTER — TELEPHONE (OUTPATIENT)
Dept: CARDIOLOGY | Facility: CLINIC | Age: 79
End: 2024-09-27
Payer: MEDICARE

## 2024-09-27 NOTE — TELEPHONE ENCOUNTER
Pt called to report she is having issues with Toprol XL and Losartan. Per pt, she is constipated and sometimes it takes 3-4 days to have a bowel movement.Pt also states she has a fast heart and dizziness prior to having a bowel movement. Pt states she has dizziness and is now using a cane or walker which she believes all these sx's are due to Toprol XL 12.5mg daily and Losartan 25mg daily. Home /79, HR 75-80. Informed pt that these sx's have been discussed with Dr. Gomez and they are not related to medications above. Pt insistent the medications are the cause of sx's and states she is very nervous.    Instructed pt to continue current medications as ordered by Dr. Gomez and I will discuss with him upon his return. Pt verbalizes understanding.    Next ov 10/29/24.     Please advise. Thanks.

## 2024-09-30 ENCOUNTER — APPOINTMENT (OUTPATIENT)
Dept: PHYSICAL THERAPY | Facility: CLINIC | Age: 79
End: 2024-09-30
Payer: MEDICARE

## 2024-09-30 NOTE — TELEPHONE ENCOUNTER
None of these symptoms are due to her medications.  They are related to other problems. Needs to see her primary care physician.  We feel that she may possibly have Parkinsons which would explain much of her symptoms.

## 2024-09-30 NOTE — TELEPHONE ENCOUNTER
Per Dr. Gomez, called and notified patient. Pt agreeable to follow up with PCP. Pt verbalizes understanding of all instructions and information provided.

## 2024-10-03 ENCOUNTER — TREATMENT (OUTPATIENT)
Dept: PHYSICAL THERAPY | Facility: CLINIC | Age: 79
End: 2024-10-03
Payer: MEDICARE

## 2024-10-03 DIAGNOSIS — Z96.651 PRESENCE OF RIGHT ARTIFICIAL KNEE JOINT: ICD-10-CM

## 2024-10-03 PROCEDURE — 97112 NEUROMUSCULAR REEDUCATION: CPT | Mod: GP,CQ

## 2024-10-03 PROCEDURE — 97110 THERAPEUTIC EXERCISES: CPT | Mod: GP,CQ

## 2024-10-03 NOTE — PROGRESS NOTES
Physical Therapy  Physical Therapy Progress Note    Patient Name Edelmira Montesinos   MRN: [id number]  Today's Date: 10/3/2024  Time Calculation  Start Time: 1015  Stop Time: 1110  Time Calculation (min): 55 min    Insurance:    Visit #:   18   Insurance Reviewed  (per information provided by  pre-cert team)  [facility] certification date range:  7-26-24/10-23-24(sent 3 times)(signed)    Therapy Diagnoses:   Problem List Items Addressed This Visit             ICD-10-CM       Musculoskeletal and Injuries    Presence of right artificial knee joint Z96.651     General:  Reason for visit: s/p R TKR   Referred by: Jordan Shen MD appt:  none scheduled  Preferred Name:  Edelmira  Script:  PT  Onset Date:  6-19-24  Most recent assessment/re-assessment: 7-26-24/8-20-24/9-3-24  Email/phone #:  [email or web address]  Access Code: [email or web address]  Issued Size E tubigrip for R LE    Assessment:  Required t/c initially with step ups this date to avoid trunk momentum and relying on UE support versus weight emphasis through the quad.  Improved tolerance to manual at end range flexion.  Fatigued at end of session.  Relief with CP.   Patient needs continued work on/skilled PT for: end ROM and closed chain strength to address remaining functional, objective and subjective deficits to allow them to return to prior /optimal level of function with ADLs.  Patient is progressing with goals: improving ROM, improved endurance, functional strength  Skilled care:  exercise progression, ROM measurements,     Plan:    Continue to progress per poc:   Continue to focus on end ROM and stretching and closed chain strength.      Subjective:   Patient reports:  drowsiness continues, but she is aware of what she can handle and wants to resume walking safely with a cane to continue functional strength with gait pattern prior to other knee surgery next year.  Patient states she is able to ambulate safely around her house holding onto furniture only.   "Reports she is taking her own pulse at home and records for upcoming appointment with CNP.       Have you fallen since last visit:  no    Precautions:  no fall risk  HTN, s// R TKR and L knee OA and will need L TKR.     Pain:  2/10  Location/Type of pain:  generalized R knee stiffness/soreness    HEP compliance/understanding:  yes    Objective:   Objective Measurements:    Gait:  resumed amb with a cane into PT this date.    ROM:   R knee ext/flexion:  A: 0 AA:  115 (AROM 110) post man    Treatment:   **= HEP progression today NV= Next visit  np= not performed  nb= non-billable  G= group HEP= discharged to [facility]  Therapeutic Exercise:     23 minutes  Nu-step:  soft tissue warm up and subjective taken:  Lev 4 x 10', seat 6  Stair R  knee flex:  10/10\"**/ext stretch:  3/30\"**  R hip flexor/calf stretch:  3/30\"*  HS with belt:  10/10\"**  QS with towel roll under heel/knee:  15/5\"**    Neuromuscular Re-education:    22 Minutes    R SLS:  x 5/22\" max  Step ups for:  8\" x 10 /lat: B UE support:  6\" x 20  Step downs:  6\" with B UE support x 15  Airex - partial squats 2 x 10      Modalities:                                           Cold Pack                 10          Minutes  Applied to R. Knee seated    Education:  V/c for posture and decreasing UE reliance with step ups.   HEP:    Access Code: [email or web address]  URL: [email or web address]  Date: 08/01/2024  Prepared by: Dali Ngo     Exercises  - Standing Knee Flexion Stretch on Step  - 1 x daily - 7 x weekly - 1 sets - 10 reps - 10 second hold  - Standing Hamstring Stretch on Chair  - 1-2 x daily - 7 x weekly - 1 sets - 3 reps - 30 second hold  - Standing Hip Flexor Stretch with Chair  - 1-2 x daily - 7 x weekly - 1 sets - 3 reps - 30 second hold  - Single Leg Stance  - 1 x daily - 7 x weekly - 1 sets - 5-10 reps - up to 30 second hold  - Standing Terminal Knee Extension with Resistance  - 1 x daily - 7 x weekly - 2 sets - 10 reps - 5 second hold  - " Mini Squat with Counter Support  - 1 x daily - 7 x weekly - 2 sets - 10 reps

## 2024-10-07 ENCOUNTER — APPOINTMENT (OUTPATIENT)
Dept: PHYSICAL THERAPY | Facility: CLINIC | Age: 79
End: 2024-10-07
Payer: MEDICARE

## 2024-10-10 ENCOUNTER — TREATMENT (OUTPATIENT)
Dept: PHYSICAL THERAPY | Facility: CLINIC | Age: 79
End: 2024-10-10
Payer: MEDICARE

## 2024-10-10 DIAGNOSIS — Z96.651 PRESENCE OF RIGHT ARTIFICIAL KNEE JOINT: ICD-10-CM

## 2024-10-10 PROCEDURE — 97112 NEUROMUSCULAR REEDUCATION: CPT | Mod: GP,CQ

## 2024-10-10 PROCEDURE — 97110 THERAPEUTIC EXERCISES: CPT | Mod: GP,CQ

## 2024-10-10 NOTE — PROGRESS NOTES
"Physical Therapy  Physical Therapy Progress Note    Patient Name Edelmira Montesinos   MRN: [id number]  Today's Date: 10/10/2024  Time Calculation  Start Time: 1015  Stop Time: 1110  Time Calculation (min): 55 min    Insurance:    Visit #:   19   Insurance Reviewed  (per information provided by  pre-cert team)  [facility] certification date range:  7-26-24/10-23-24(sent 3 times)(signed)    Therapy Diagnoses:   Problem List Items Addressed This Visit             ICD-10-CM       Musculoskeletal and Injuries    Presence of right artificial knee joint Z96.651     General:  Reason for visit: s/p R TKR   Referred by: Jordan Shen MD appt:  none scheduled  Preferred Name:  Edelmira  Script:  PT  Onset Date:  6-19-24  Most recent assessment/re-assessment: 7-26-24/8-20-24/9-3-24  Email/phone #:  [email or web address]  Access Code: [email or web address]  Issued Size E tubigrip for R LE      Subjective:   Patient reports:  no new complaints this date.  Continues to ambulate with cane though mild drowsiness continues.    Have you fallen since last visit:  no    Precautions:  no fall risk  HTN, s// R TKR and L knee OA and will need L TKR.     Pain:  2/10  Location/Type of pain:  generalized R knee stiffness/soreness    HEP compliance/understanding:  yes    Objective:   Objective Measurements:      ROM:   R knee ext/flexion:  A: 1 AA:  115 (AROM 110) at end of session    Treatment:   **= HEP progression today NV= Next visit  np= not performed  nb= non-billable  G= group HEP= discharged to [facility]  Therapeutic Exercise:     23 minutes  Nu-step:  soft tissue warm up and subjective taken:  Lev 4 x 10', seat 6  Stair R  knee flex:  10/10\"**/ext stretch:  3/30\"**  R hip flexor/calf stretch:  3/30\"*  HS with belt:  10/10\"**  QS with towel roll under heel/knee:  15/5\"**  SAQ 1# 2 x 10  LAQ 1# 2 x 10    Neuromuscular Re-education:    22 Minutes    R SLS:  x 5/22\" max/NP  Airex - R. LE SLS 8\" max x 5 trials  Step ups for:  8\" 2 x 10 /lat: B UE support:  " "8\" x 20  Step downs:  6\" with B UE support x 15/NV  Airex - partial squats 2 x 10  Airex - Marching x 20  High juana amb x 3 hurdles laterally x 3 laps w/lucian bar + cane/SBA  [place] amb x 3 hurdles fwd x 3 laps w/lucian bar + cane/SBA      Modalities:                                           Cold Pack                 10          Minutes  Applied to R. Knee seated    Assessment:  Advanced SL balance on surgical limb to Airex to challenge stability on uneven terrain.  Able to demonstrate reciprocal gait with object clearance on forward juana amb.  Able to increase lateral step height to 8\".  Tolerated consecutive standing ex this date with improved fluidity and consistency with motion.  Fatigued at end of session.  Relief with CP.   Patient needs continued work on/skilled PT for: end ROM and closed chain strength to address remaining functional, objective and subjective deficits to allow them to return to prior /optimal level of function with ADLs.  Patient is progressing with goals: improving ROM, improved endurance, functional strength  Skilled care:  exercise progression, ROM measurements, balance progression    Plan:    Continue to progress per poc:   Continue to focus on end ROM and stretching and closed chain strength.    Re-evaluate next visit.     Education:  V/c for posture and decreasing UE reliance with step ups.   HEP:    Access Code: [email or web address]  URL: [email or web address]  Date: 08/01/2024  Prepared by: Dali Ngo     Exercises  - Standing Knee Flexion Stretch on Step  - 1 x daily - 7 x weekly - 1 sets - 10 reps - 10 second hold  - Standing Hamstring Stretch on Chair  - 1-2 x daily - 7 x weekly - 1 sets - 3 reps - 30 second hold  - Standing Hip Flexor Stretch with Chair  - 1-2 x daily - 7 x weekly - 1 sets - 3 reps - 30 second hold  - Single Leg Stance  - 1 x daily - 7 x weekly - 1 sets - 5-10 reps - up to 30 second hold  - Standing Terminal Knee Extension with Resistance  - " 1 x daily - 7 x weekly - 2 sets - 10 reps - 5 second hold  - Mini Squat with Counter Support  - 1 x daily - 7 x weekly - 2 sets - 10 reps

## 2024-10-14 ENCOUNTER — APPOINTMENT (OUTPATIENT)
Dept: PHYSICAL THERAPY | Facility: CLINIC | Age: 79
End: 2024-10-14
Payer: MEDICARE

## 2024-10-15 ENCOUNTER — APPOINTMENT (OUTPATIENT)
Dept: PHYSICAL THERAPY | Facility: CLINIC | Age: 79
End: 2024-10-15
Payer: MEDICARE

## 2024-10-17 ENCOUNTER — TREATMENT (OUTPATIENT)
Dept: PHYSICAL THERAPY | Facility: CLINIC | Age: 79
End: 2024-10-17
Payer: MEDICARE

## 2024-10-17 DIAGNOSIS — Z96.651 PRESENCE OF RIGHT ARTIFICIAL KNEE JOINT: ICD-10-CM

## 2024-10-17 PROCEDURE — 97110 THERAPEUTIC EXERCISES: CPT | Mod: GP | Performed by: PHYSICAL THERAPIST

## 2024-10-17 NOTE — PROGRESS NOTES
Physical Therapy  Physical Therapy Progress Note/re-assessment/discharge summary    Patient Name Edelmira Montesinos   MRN: [id number]  Today's Date: 10/17/2024  Time Calculation  Start Time: 1030  Stop Time: 1115  Time Calculation (min): 45 min    Insurance:    Visit #:   20   Insurance Reviewed  (per information provided by  pre-cert team)  [facility] certification date range:  7-26-24/10-23-24(sent 3 times)(signed)    Therapy Diagnoses:   Problem List Items Addressed This Visit             ICD-10-CM    Presence of right artificial knee joint Z96.651     General:  Reason for visit: s/p R TKR   Referred by: Jordan Shen MD appt:  10-21-24  Preferred Name:  Edelmira  Script:  PT  Onset Date:  6-19-24  Most recent assessment/re-assessment: 7-26-24/8-20-24/9-3-24/10-16-24  Email/phone #:  [email or web address]  Access Code: [email or web address]  Issued Size E tubigrip for R LE    Subjective:   Patient reports:  that the R knee is feeling good and she is hoping to have the L TKR in the Spring of 2025     Have you fallen since last visit:  no    Precautions:  no fall risk  HTN, s// R TKR and L knee OA and will need L TKR.     Pain:  0/10  Location/Type of pain:  occasional R LE muscle pain    HEP compliance/understanding:  yes    Objective:   Objective Measurements:   Sleep: up to the bathroom, supine, instructed in proper postures.  ADL's:  I with dressing and showering, using walker in the shower.  No change  Chores:  WNL (was doing more cooking and dishes) (was some light chores, would like to resume yard work and gardening. )   Driving: hopes to resume driving, has not for one year due to HTN issues.  WNL (was Better) (was Slow) with transfers.    Work:  housewife.    Recreation: WNL (was has been able to do more) (was wants to resume activities around the house and with family.)       Objective:    Outcome Measures:  Other Measures  Lower Extremity Funtional Score (LEFS): 54/16%(was 37/43%)(was 44/33%)(was 30 53%) limitation  "of function.      Posture:  decreased WB R LE.  Compression stockings R distal LE.    8-20-24:  recently stopped wearing compression stockings, some increased distal LE edema noted.      Gait/stairs:  decreased stance time, decreased hip ext and trunk rotation, min hesitation with R LE WB and step to pattern on stairs with B UE support with L LE WB.    8-20-24:  able to ambulate with straight cane indoors with good pattern, but slow angelo, up and down stairs with cane and rail ascending reciprocally and descending with B rails with reciprocal pattern with mod decreased motor control with descending with  R LE WB.    9-3-24:  ambulating with straight cane indoors and outdoors with good pattern with R LE, some cues to flex L knee at swing through, reciprocal pattern on stairs with B rail support with good pattern with R LE WB.       Balance:   R SLS:  30\"(was Unable)     ROM:  R  knee ext/flex:  A: 0/122(was -16/100)(was 85) after stretching:  A: -8 /AA:  105(was 91)     MMT:   Hip flexors:  4/5(was 4-/5)  ext: supine leg press:  4+/5(was 4/5)(was 4-/5)  abd: 4/5(was 4-/5)  add: 4/5(was 3/5)(was 3-/5)  Quads: 4+/5(was 4 quad lag(was 8)(was 15)  Hams: supine:  4/5(was 4-/5)  DF: 4+/5(was 4/5)  PF: (NWB):  4+/5(was 4/5)     Flexibility:    Hams:  90/90: -22(was -30)(was -32)  Heelcords: 5(was 3)(was 0)  Hip flexors:  mod (was mod/sev)     Palpation:  mod LE edema and scabbing over incision and some redness noted.    9-3-24:  Incision well healed.     Treatment:   **= HEP progression today NV= Next visit  np= not performed  nb= non-billable  G= group HEP= discharged to HEP  Therapeutic Exercise:     45 minutes  Nu-step: soft tissue warm up and subjective taken: Lev 4 x 10', seat 6   Re-assessment    Education:  discharge plan    Assessment:  Skilled care:  re-assessment  STG:  In two weeks.   -Increased postural awareness.  Met  -Compliant with HEP Met  LTG: by discharge  -Increased postural awareness and posture WFL.  " "Met  -Increased function with ADL's and IADL's.  Improve LEFS to:  20 %  limitation of function. Met  -Normal gait pattern  on level and uneven surfaces community level distances for improved function in the community.  Met other than deficits due to L knee OA  -Normal reciprocal pattern on stairs for improved function to all levels of home.  Met/some deficit with descending due to L knee OA.    -Increase  R SLS to 15\".  Met   -Increase R knee AROM to 0/120 for improved function with car transfers. Met  -Increase R LE strength to WNL for improved function with chores.. Met  -Increase R LE flexibility to WFL.  Met  -I and compliant with HEP and proper: R LE care.  Met    Plan:    Discharge from PT and continue with HEP and follow up with MD later this month.    HEP:    Access Code: [email or web address]  URL: [email or web address]  Date: 08/01/2024  Prepared by: Dali Ngo     Exercises  - Standing Knee Flexion Stretch on Step  - 1 x daily - 7 x weekly - 1 sets - 10 reps - 10 second hold  - Standing Hamstring Stretch on Chair  - 1-2 x daily - 7 x weekly - 1 sets - 3 reps - 30 second hold  - Standing Hip Flexor Stretch with Chair  - 1-2 x daily - 7 x weekly - 1 sets - 3 reps - 30 second hold  - Single Leg Stance  - 1 x daily - 7 x weekly - 1 sets - 5-10 reps - up to 30 second hold  - Standing Terminal Knee Extension with Resistance  - 1 x daily - 7 x weekly - 2 sets - 10 reps - 5 second hold  - Mini Squat with Counter Support  - 1 x daily - 7 x weekly - 2 sets - 10 reps  "

## 2024-10-22 ENCOUNTER — APPOINTMENT (OUTPATIENT)
Dept: PHYSICAL THERAPY | Facility: CLINIC | Age: 79
End: 2024-10-22
Payer: MEDICARE

## 2024-10-25 PROBLEM — I83.93 VARICOSE VEINS OF BOTH LOWER EXTREMITIES: Status: RESOLVED | Noted: 2017-08-12 | Resolved: 2024-10-25

## 2024-10-29 ENCOUNTER — OFFICE VISIT (OUTPATIENT)
Dept: CARDIOLOGY | Facility: CLINIC | Age: 79
End: 2024-10-29
Payer: MEDICARE

## 2024-10-29 VITALS
DIASTOLIC BLOOD PRESSURE: 70 MMHG | BODY MASS INDEX: 27.6 KG/M2 | HEART RATE: 76 BPM | SYSTOLIC BLOOD PRESSURE: 128 MMHG | OXYGEN SATURATION: 98 % | WEIGHT: 150 LBS | HEIGHT: 62 IN

## 2024-10-29 DIAGNOSIS — I25.118 CORONARY ARTERY DISEASE OF NATIVE ARTERY OF NATIVE HEART WITH STABLE ANGINA PECTORIS: ICD-10-CM

## 2024-10-29 DIAGNOSIS — I47.10 PSVT (PAROXYSMAL SUPRAVENTRICULAR TACHYCARDIA) (CMS-HCC): ICD-10-CM

## 2024-10-29 DIAGNOSIS — E87.1 HYPONATREMIA: ICD-10-CM

## 2024-10-29 DIAGNOSIS — I83.12 VARICOSE VEINS OF BOTH LOWER EXTREMITIES WITH INFLAMMATION: Primary | ICD-10-CM

## 2024-10-29 DIAGNOSIS — I10 PRIMARY HYPERTENSION: ICD-10-CM

## 2024-10-29 DIAGNOSIS — I99.8 VASCULAR INSUFFICIENCY: ICD-10-CM

## 2024-10-29 DIAGNOSIS — R06.09 EXERTIONAL DYSPNEA: ICD-10-CM

## 2024-10-29 DIAGNOSIS — R53.82 CHRONIC FATIGUE: ICD-10-CM

## 2024-10-29 DIAGNOSIS — E78.49 OTHER HYPERLIPIDEMIA: ICD-10-CM

## 2024-10-29 DIAGNOSIS — I83.11 VARICOSE VEINS OF BOTH LOWER EXTREMITIES WITH INFLAMMATION: Primary | ICD-10-CM

## 2024-10-29 DIAGNOSIS — I50.32 CHRONIC DIASTOLIC (CONGESTIVE) HEART FAILURE: ICD-10-CM

## 2024-10-29 DIAGNOSIS — R00.2 PALPITATIONS: ICD-10-CM

## 2024-10-29 PROBLEM — I49.3 FREQUENT PVCS: Status: RESOLVED | Noted: 2023-11-21 | Resolved: 2024-10-29

## 2024-10-29 PROCEDURE — 1036F TOBACCO NON-USER: CPT | Performed by: INTERNAL MEDICINE

## 2024-10-29 PROCEDURE — 3078F DIAST BP <80 MM HG: CPT | Performed by: INTERNAL MEDICINE

## 2024-10-29 PROCEDURE — 99214 OFFICE O/P EST MOD 30 MIN: CPT | Performed by: INTERNAL MEDICINE

## 2024-10-29 PROCEDURE — 3074F SYST BP LT 130 MM HG: CPT | Performed by: INTERNAL MEDICINE

## 2024-10-29 PROCEDURE — 1159F MED LIST DOCD IN RCRD: CPT | Performed by: INTERNAL MEDICINE

## 2024-10-30 ENCOUNTER — LAB (OUTPATIENT)
Dept: LAB | Facility: LAB | Age: 79
End: 2024-10-30
Payer: MEDICARE

## 2024-10-30 DIAGNOSIS — R53.82 CHRONIC FATIGUE: ICD-10-CM

## 2024-10-30 DIAGNOSIS — I10 PRIMARY HYPERTENSION: ICD-10-CM

## 2024-10-30 DIAGNOSIS — R06.09 EXERTIONAL DYSPNEA: ICD-10-CM

## 2024-10-30 DIAGNOSIS — I50.32 CHRONIC DIASTOLIC (CONGESTIVE) HEART FAILURE: ICD-10-CM

## 2024-10-30 LAB
ALBUMIN SERPL BCP-MCNC: 4.2 G/DL (ref 3.4–5)
ALP SERPL-CCNC: 51 U/L (ref 33–136)
ALT SERPL W P-5'-P-CCNC: 6 U/L (ref 7–45)
ANION GAP SERPL CALC-SCNC: 12 MMOL/L (ref 10–20)
AST SERPL W P-5'-P-CCNC: 12 U/L (ref 9–39)
BILIRUB SERPL-MCNC: 0.4 MG/DL (ref 0–1.2)
BNP SERPL-MCNC: 74 PG/ML (ref 0–99)
BUN SERPL-MCNC: 23 MG/DL (ref 6–23)
CALCIUM SERPL-MCNC: 9.4 MG/DL (ref 8.6–10.6)
CHLORIDE SERPL-SCNC: 106 MMOL/L (ref 98–107)
CO2 SERPL-SCNC: 29 MMOL/L (ref 21–32)
CREAT SERPL-MCNC: 0.72 MG/DL (ref 0.5–1.05)
EGFRCR SERPLBLD CKD-EPI 2021: 84 ML/MIN/1.73M*2
ERYTHROCYTE [DISTWIDTH] IN BLOOD BY AUTOMATED COUNT: 14.3 % (ref 11.5–14.5)
GLUCOSE SERPL-MCNC: 89 MG/DL (ref 74–99)
HCT VFR BLD AUTO: 39.8 % (ref 36–46)
HGB BLD-MCNC: 12.5 G/DL (ref 12–16)
MCH RBC QN AUTO: 27.8 PG (ref 26–34)
MCHC RBC AUTO-ENTMCNC: 31.4 G/DL (ref 32–36)
MCV RBC AUTO: 88 FL (ref 80–100)
NRBC BLD-RTO: 0 /100 WBCS (ref 0–0)
PLATELET # BLD AUTO: 170 X10*3/UL (ref 150–450)
POTASSIUM SERPL-SCNC: 4.7 MMOL/L (ref 3.5–5.3)
PROT SERPL-MCNC: 7.2 G/DL (ref 6.4–8.2)
RBC # BLD AUTO: 4.5 X10*6/UL (ref 4–5.2)
SODIUM SERPL-SCNC: 142 MMOL/L (ref 136–145)
TSH SERPL-ACNC: 2.98 MIU/L (ref 0.44–3.98)
WBC # BLD AUTO: 5.1 X10*3/UL (ref 4.4–11.3)

## 2024-10-30 PROCEDURE — 84443 ASSAY THYROID STIM HORMONE: CPT

## 2024-10-30 PROCEDURE — 80053 COMPREHEN METABOLIC PANEL: CPT

## 2024-10-30 PROCEDURE — 85027 COMPLETE CBC AUTOMATED: CPT

## 2024-10-30 PROCEDURE — 36415 COLL VENOUS BLD VENIPUNCTURE: CPT

## 2024-10-30 PROCEDURE — 83880 ASSAY OF NATRIURETIC PEPTIDE: CPT

## 2024-11-01 ENCOUNTER — TELEPHONE (OUTPATIENT)
Dept: CARDIOLOGY | Facility: CLINIC | Age: 79
End: 2024-11-01
Payer: MEDICARE

## 2024-11-12 NOTE — PROGRESS NOTES
Subjective   Edelmira Montesinos is a 79 y.o. female.    Chief Complaint:  Follow-up echo study for complaints of fatigue shortness of breath and exertional dyspnea.    HPI    On her last visit she presents to us with symptoms of exertional dyspnea and generalized fatigue.  Also has generalized weakness.  She is here for follow-up of her testing.      Her diagnosis of coronary artery disease is based on findings of calcifications in the distribution of the coronary arteries seen on a prior CT scan of the chest.     Her past cardiac history is unremarkable. There is no history of diabetes. There is no smoking history. No family history of premature coronary artery disease.     She has had a number of side effects to multiple medications.     She has been seen by the neurology service. It was felt that she may have subtle changes of Parkinson's.     Other medical problems include a history of varicose veins and degenerative arthritis.     Allergies  Medication    · bisoprolol   Recorded By: Sera Crawford; 6/15/2020 8:49:56 AM   · metoprolol   Recorded By: Sera Crawford; 6/15/2020 8:49:56 AM   · spironolactone   Recorded By: Sera Crawford; 2020 12:07:52 PM   · telmisartan   Recorded By: Sera Crawford; 8/10/2020 10:09:16 AM     Family History  Mother    · Family history of    · Family history of Old age  Father    · Family history of cerebral infarction (V17.1) (Z82.3)     Social History  Problems    · Daily caffeine consumption   · Never a smoker   · No alcohol use     Review of Systems   Constitutional: Positive for malaise/fatigue.   Cardiovascular:  Positive for dyspnea on exertion.   Musculoskeletal:  Positive for arthritis and joint pain.   Neurological:  Positive for weakness.     Current Outpatient Medications   Medication Sig Dispense Refill    albuterol 90 mcg/actuation inhaler Inhale 2 puffs every 4 hours if needed for wheezing or shortness of breath.      benzonatate (Tessalon Perles)  "100 mg capsule Take 1 capsule (100 mg) by mouth 3 times a day as needed for cough. 30 capsule 0    calcium citrate-vitamin D3 315 mg-6.25 mcg (250 unit) tablet Take 1 tablet (315 mg) by mouth 2 times daily (morning and late afternoon). (Patient taking differently: Take 1 tablet (315 mg) by mouth 2 times daily (morning and late afternoon). As needed)      losartan (Cozaar) 25 mg tablet Take 1 tablet (25 mg) by mouth once daily.      metoprolol succinate XL (Toprol-XL) 25 mg 24 hr tablet Take 0.5 tablets (12.5 mg) by mouth once daily. Do not crush or chew. 45 tablet 3     No current facility-administered medications for this visit.        Visit Vitals  /70 (BP Location: Left arm)   Pulse 68   Ht 1.575 m (5' 2\")   Wt 68 kg (150 lb)   SpO2 98%   BMI 27.44 kg/m²   Smoking Status Never   BSA 1.72 m²        Objective     Constitutional:       Appearance: Not in distress.   Neck:      Vascular: JVD normal.   Pulmonary:      Breath sounds: Normal breath sounds.   Cardiovascular:      Normal rate. Regular rhythm. Normal S1. Normal S2.       Murmurs: There is no murmur.      No gallop.    Pulses:     Intact distal pulses.   Edema:     Peripheral edema absent.   Abdominal:      General: There is no distension.      Palpations: Abdomen is soft.   Neurological:      Mental Status: Alert.         Lab Review:   Lab Results   Component Value Date     10/30/2024    K 4.7 10/30/2024     10/30/2024    CO2 29 10/30/2024    BUN 23 10/30/2024    CREATININE 0.72 10/30/2024    GLUCOSE 89 10/30/2024    CALCIUM 9.4 10/30/2024     Lab Results   Component Value Date    CHOL 214 (H) 12/29/2020    TRIG 140 12/29/2020    HDL 67.9 12/29/2020       Assessment:    1.  Shortness of breath.  Today's echocardiographic study demonstrates normal left ventricular systolic function.  She does have left ventricular hypertrophy and diastolic dysfunction.  A BNP was 74 indicating that she is euvolemic.    2.  Hypertension.  Blood pressures " are quite good.    3.  Coronary artery disease.  A stress thallium study in February 2024 showed no ischemia with an ejection fraction of 75%.    4.  Generalized weakness and fatigue.  We feel strongly that this is unrelated to her cardiac status or to her medications.  We have suggested that she may have another process going on.  We have suspected Parkinson's but this is not clear.  Did see a neurologist a few years ago who wanted to do additional testing for Parkinson's.

## 2024-11-14 ENCOUNTER — OFFICE VISIT (OUTPATIENT)
Dept: CARDIOLOGY | Facility: CLINIC | Age: 79
End: 2024-11-14
Payer: MEDICARE

## 2024-11-14 ENCOUNTER — HOSPITAL ENCOUNTER (OUTPATIENT)
Dept: CARDIOLOGY | Facility: CLINIC | Age: 79
Discharge: HOME | End: 2024-11-14
Payer: MEDICARE

## 2024-11-14 VITALS
WEIGHT: 150 LBS | SYSTOLIC BLOOD PRESSURE: 128 MMHG | BODY MASS INDEX: 27.6 KG/M2 | HEIGHT: 62 IN | DIASTOLIC BLOOD PRESSURE: 70 MMHG

## 2024-11-14 VITALS
WEIGHT: 150 LBS | DIASTOLIC BLOOD PRESSURE: 70 MMHG | SYSTOLIC BLOOD PRESSURE: 132 MMHG | HEIGHT: 62 IN | OXYGEN SATURATION: 98 % | BODY MASS INDEX: 27.6 KG/M2 | HEART RATE: 68 BPM

## 2024-11-14 DIAGNOSIS — R06.09 EXERTIONAL DYSPNEA: ICD-10-CM

## 2024-11-14 DIAGNOSIS — R06.00 DYSPNEA, UNSPECIFIED: ICD-10-CM

## 2024-11-14 DIAGNOSIS — I50.32 CHRONIC DIASTOLIC (CONGESTIVE) HEART FAILURE: ICD-10-CM

## 2024-11-14 DIAGNOSIS — I25.118 CORONARY ARTERY DISEASE OF NATIVE ARTERY OF NATIVE HEART WITH STABLE ANGINA PECTORIS: ICD-10-CM

## 2024-11-14 DIAGNOSIS — I10 PRIMARY HYPERTENSION: ICD-10-CM

## 2024-11-14 DIAGNOSIS — R00.2 PALPITATIONS: ICD-10-CM

## 2024-11-14 DIAGNOSIS — E78.49 OTHER HYPERLIPIDEMIA: ICD-10-CM

## 2024-11-14 DIAGNOSIS — I47.10 PSVT (PAROXYSMAL SUPRAVENTRICULAR TACHYCARDIA) (CMS-HCC): Primary | ICD-10-CM

## 2024-11-14 DIAGNOSIS — I25.10 ATHEROSCLEROTIC HEART DISEASE OF NATIVE CORONARY ARTERY WITHOUT ANGINA PECTORIS: ICD-10-CM

## 2024-11-14 DIAGNOSIS — I50.42 CHRONIC COMBINED SYSTOLIC (CONGESTIVE) AND DIASTOLIC (CONGESTIVE) HEART FAILURE: ICD-10-CM

## 2024-11-14 PROCEDURE — 93306 TTE W/DOPPLER COMPLETE: CPT

## 2024-11-14 PROCEDURE — 3075F SYST BP GE 130 - 139MM HG: CPT | Performed by: INTERNAL MEDICINE

## 2024-11-14 PROCEDURE — 93306 TTE W/DOPPLER COMPLETE: CPT | Performed by: INTERNAL MEDICINE

## 2024-11-14 PROCEDURE — 99213 OFFICE O/P EST LOW 20 MIN: CPT | Performed by: INTERNAL MEDICINE

## 2024-11-14 PROCEDURE — 3078F DIAST BP <80 MM HG: CPT | Performed by: INTERNAL MEDICINE

## 2024-11-14 PROCEDURE — 1159F MED LIST DOCD IN RCRD: CPT | Performed by: INTERNAL MEDICINE

## 2024-11-14 PROCEDURE — 1036F TOBACCO NON-USER: CPT | Performed by: INTERNAL MEDICINE

## 2024-11-14 NOTE — PATIENT INSTRUCTIONS
Your echocardiogram shows normal heart function and normal heart valve function.    Continue the metoprolol and the losartan.

## 2024-11-15 LAB
AORTIC VALVE MEAN GRADIENT: 5 MMHG
AORTIC VALVE PEAK VELOCITY: 1.5 M/S
AV PEAK GRADIENT: 9 MMHG
AVA (PEAK VEL): 2.56 CM2
AVA (VTI): 2.51 CM2
EJECTION FRACTION APICAL 4 CHAMBER: 54.2
EJECTION FRACTION: 63 %
LEFT ATRIUM VOLUME AREA LENGTH INDEX BSA: 23.3 ML/M2
LEFT VENTRICLE INTERNAL DIMENSION DIASTOLE: 3.48 CM (ref 3.5–6)
LEFT VENTRICULAR OUTFLOW TRACT DIAMETER: 1.97 CM
MITRAL VALVE E/A RATIO: 0.78
RIGHT VENTRICLE FREE WALL PEAK S': 1.4 CM/S
TRICUSPID ANNULAR PLANE SYSTOLIC EXCURSION: 2.4 CM

## 2024-12-13 DIAGNOSIS — I10 PRIMARY HYPERTENSION: ICD-10-CM

## 2024-12-16 RX ORDER — METOPROLOL SUCCINATE 25 MG/1
25 TABLET, EXTENDED RELEASE ORAL DAILY
Qty: 90 TABLET | Refills: 3 | Status: SHIPPED | OUTPATIENT
Start: 2024-12-16

## 2025-02-21 ENCOUNTER — APPOINTMENT (OUTPATIENT)
Dept: CARDIOLOGY | Facility: CLINIC | Age: 80
End: 2025-02-21
Payer: MEDICARE

## 2025-04-17 ENCOUNTER — TELEPHONE (OUTPATIENT)
Dept: CARDIOLOGY | Facility: CLINIC | Age: 80
End: 2025-04-17
Payer: MEDICARE

## 2025-04-17 NOTE — TELEPHONE ENCOUNTER
Pt left a vm stating that she believes that Metoprolol is making her drowsy and would like to speak with someone about this.

## 2025-04-18 NOTE — TELEPHONE ENCOUNTER
"Pt returned call. Pt states she take Losartan 25mg daily around 1:00 pm and Metoprolol Succinate 25mg in the morning. *Pt states she is taking 1/2 tablet of Metoprolol because she cannot tolerate a full tablet.    Pt stated after taking the Metoprolol she feels \"drowsy\". I recommended for pt to try taking Metoprolol in the evening if she feels drowsy but she clarified and stated she feels off balance and woozy with metoprolol. Pt insistent dx's due to Metoprolol. Informed pt she has been taking this medication for quite some time. Pt also states that about an hour after taking Metoprolol and doing chores, HR seems to go up to 100-102. Informed pt Metoprolol will lower HR and not increase HR and it will be elevated with activity such as walking and doing household chores.     Also discussed with pt that Metoprolol Succinate 25mg is to be taken as 1 tablet daily per Dr. Gomez's last ov note. Pt stated she cannot tolerate a full tablet.     "

## 2025-07-17 ENCOUNTER — EVALUATION (OUTPATIENT)
Dept: PHYSICAL THERAPY | Facility: CLINIC | Age: 80
End: 2025-07-17
Payer: MEDICARE

## 2025-07-17 DIAGNOSIS — M25.662 KNEE STIFFNESS, LEFT: ICD-10-CM

## 2025-07-17 DIAGNOSIS — M25.562 ACUTE PAIN OF LEFT KNEE: Primary | ICD-10-CM

## 2025-07-17 PROCEDURE — 97161 PT EVAL LOW COMPLEX 20 MIN: CPT | Mod: GP

## 2025-07-17 PROCEDURE — 97110 THERAPEUTIC EXERCISES: CPT | Mod: GP

## 2025-07-17 ASSESSMENT — ENCOUNTER SYMPTOMS
OCCASIONAL FEELINGS OF UNSTEADINESS: 0
LOSS OF SENSATION IN FEET: 0
DEPRESSION: 0

## 2025-07-17 NOTE — Clinical Note
July 18, 2025    Jordan Dawson MD  [facility]  [address] [facility] [address]    Patient: Edelmira Montesinos   YOB: 1945   Date of Visit: 7/17/2025       Dear Jordan Dawson MD  [facility]  [address] [place] [address]    The attached plan of care is being sent to you because your patient’s medical reimbursement requires that you certify the plan of care. Your signature is required to allow uninterrupted insurance coverage.      You may indicate your approval by signing below and faxing this form back to us at Dept Fax: [phone].    Please call Dept: [phone] with any questions or concerns.    Thank you for this referral,        Padmini Bowling, PT  [facility] [address] [facility]  [address] [facility] [address]    Payer: Payor: MEDICARE / Plan: MEDICARE PART A AND B / Product Type: *No Product type* /                                                                         Date:     Dear Padmini Bowling, PT,     Re: Ms. Edelmira Montesinos, MRN:[id number]    I certify that I have reviewed the attached plan of care and it is medically necessary for Ms. Edelmira Montesinos (1945) who is under my care.          ______________________________________                    _________________  Provider name and credentials                                           Date and time                                                                                           Plan of Care 7/17/25   Effective from: 7/17/2025  Effective to: 10/15/2025    Plan ID: [id number]            Participants as of Finalize on 7/18/2025    Name Type Comments Contact Info    Jordan Dawson MD Referring Provider  [phone]    Padmini Bowling, PT Physical Therapist  [phone]       Last Plan Note     Author: Padmini Bowling PT Status: Signed Last edited: 7/17/2025  9:45 AM       Patient Name Edelmira  "Jaguar  MRN: [id number]  Today's Date: 07/17/25  Time Calculation  Start Time: 0945  Stop Time: 1030  Time Calculation (min): 45 min    Insurance:   Visit #: 1  Insurance Reviewed  (per information provided by  pre-cert team)   Authorization required:  N  Approved # of visits: MN  Authorization/MCR certification date range: 7/17/2025 to 10/15/2025    Therapy Diagnoses:   Problem List Items Addressed This Visit           ICD-10-CM    Acute pain of left knee - Primary M25.562    Relevant Orders    Follow Up In Physical Therapy    Knee stiffness, left M25.662    Relevant Orders    Follow Up In Physical Therapy     General:  Reason for visit: S/P left TKA   Referred by: Dr Eunice Shen MD appt:  7/24/2025  Preferred Name:  Edelmira  Script:  PT eval and treat  Onset Date:  6/11/2025    Subjective:    HPI: Pt underwent left TKA on 6/11/2025. Pt had right TKA on 6/13/2024    Pain      Type of pain:  5/10 Left knee: random shooting pain left medial knee to incision. Also, intermittent \"biting\" pain of left medial and proximal tibia.   What increases pain: pain can happen at anytime  What decreases pain:  ice, Tylenol    Medical Hx/  Heart disease (fast heartbeat), HTN  Precautions:       Adult Screening Risk:  no falls within last year    Fall Risk:  low with FWW    Patient goal:  To walk normal  Patient is aware of diagnosis and prognosis.    Living Environment:    Social Support:  lives with:  spouse and adult son  DME: tub bench/toilet arm risers    Prior Function:   Pt was functioning \"normally\", using a cane when going out, prior to surgery    Function:    A and O x 3  Sleep:  disturbed sleep  ADL's: independent, including her meals  Chores: light chores, (dishes, folding clothes)  Driving: not driving yet  Work: retired  Recreation: unable  Sitting: stiff upon standing  Standing: increased pain within a few minutes    Walking: limited to shorter distances    Objective:    Outcome Measures:  Other Measures  Lower " Extremity Funtional Score (LEFS): 19     Posture:  decreased WB LE,  LE ER at hip.      Gait/stairs:  With FWW ambulates with steady angelo. Pt demonstrates ability to go up and down 4 steps in alternating fashion with 2 handles    Balance:   L SLS:  0    ROM:  L knee ext/flex:  A: 5-100 AA 2-103    MMT:   R  L  Hip   flex:  5  4+   ext: deferred  abd:  5  4  add: deferred  Quads:  5  4  Hams:  5  4  DF:   5  5  PF:  5  5    Flexibility:  :  Hip flexors:  mild tightness bladimir    Palpation:  NT    Assessment:    Patient is s/p left TKA on 6/11/2025. Pt is doing well for timeframe with left knee active knee ext and flexion and with going up and down steps. Pt  needs work on ROM, flexibility, posture, closed chain, strength, balance, gait and stairs for improved overall function.    Problems:    Pain:  _x__  Posture/Body mechanics deficits:  __x_  Decreased knowledge HEP:  _x__  Decreased knowledge of Precautions:  _x__  Activity Limitations:   _x__  ADL's/IADL's/Self-care skills:  __x_  ROM/Joint Mobility:  __x_  Strength:  __x_  Decreased functional level:   _x__  Flexibility:  __x_  Tenderness/decreased soft tissue mobility:  __x_  Gait/Stairs:  _x__  Fall Risk:  _x__  Balance:  x___  Edema:  _x__  Participation restrictions:  ___  Sensory:  ___  Transfers:  ___  Decreased knowledge of brace:   ___  Other:  ___    X Indicates included in problem list    Goals:      STG:  In two weeks.   Increased postural awareness.     Decreased pain by 2 points to allow patient to progress to SPC    Compliant with HEP    LTG: by discharge    Increased postural awareness and posture WFL.    Increased function with ADL's and IADL's.  Improve LEFS to:  20 %  limitation of function.    Normal gait pattern on level and uneven surfaces community level distances with least device for improved function in the community.     Normal reciprocal pattern on stairs for improved function to all levels of home.      Increase  L SLS to  "15\"    Increase L knee AROM to 0/120 for improved function with car transfers.     Increase L strength to WNL for improved function with chores.    Increase L LE flexibility to WFL.      I and compliant with HEP and proper: L LE care.      Rehab potential to achieve the above goals is good.    Patient is aware of diagnosis and prognosis and agrees with established plan of care and goals.    Plan:   Skilled PT 2 x/week for 4-6 weeks (Until goals achieved, maximum rehab potential has been achieved, or patient has plateaued)  for:    Aquatics  _____  CP   _x___  Dry needling  ____  Education  __x__  Electrical Stimulation  ____  Gait training  __x__  HEP  __x__  HP  ____  Manual  __x__  Mechanical Traction  ____  NMR  __x__  Self-care/home management  _x___  Therapeutic Exercise   _x___  Therapeutic Activities  _x___  US  ____  Work Conditioning  ____  Re-assessment  ____  Other  ____    X Indicates included in treatment plan    PT for Nu-step for functional mobility and soft tissue warm up for more efficient stretching, work on quad stab through progressive resistance of SLR/SAQ, and adding TKE with bands.     Treatment:    Evaluation:  35 minutes    Therapeutic Exercise:  10  No formal ex program given. Verbally reviewed what patient is currently doing at home which includes supine and standing ex for left knee mobility, and LE strengthening. Discussed precautions with ex and also demonstrated good way to place pillow under lower leg without increased left knee flexion    Education:  poc, anatomy, physiology, posture, body mechanics, safety awareness, HEP.  Preferred learning:  pictures, demonstration.  Demonstrated good understanding.                                               Current Participants as of 7/18/2025    Name Type Comments Contact Info    Jordan Dawson MD Referring Provider  [phone]    Signature pending    Padmini Bowling, PT Physical Therapist  [phone]    Signature pending      "

## 2025-07-17 NOTE — Clinical Note
July 18, 2025    [place]  [address] [facility] [address]    Patient: Edelmira Montesinos   YOB: 1945   Date of Visit: 7/17/2025       Dear Jordan Dawson MD  [facility]  [address] [place] [address]    The attached plan of care is being sent to you because your patient’s medical reimbursement requires that you certify the plan of care. Your signature is required to allow uninterrupted insurance coverage.      You may indicate your approval by signing below and faxing this form back to us at Dept Fax: [phone].    Please call Dept: [phone] with any questions or concerns.    Thank you for this referral,        Padmini Bowling PT  [facility] [address] [facility]  [address] [facility] [address]    Payer: Payor: MEDICARE / Plan: MEDICARE PART A AND B / Product Type: *No Product type* /                                                                         Date:     Dear Padmini Bowling, PT,     Re: Ms. Edelmira Montesinos, MRN:[id number]    I certify that I have reviewed the attached plan of care and it is medically necessary for Ms. Edelmira Montesinos (1945) who is under my care.          ______________________________________                    _________________  Provider name and credentials                                           Date and time                                                                                           Plan of Care 7/17/25   Effective from: 7/17/2025  Effective to: 10/15/2025    Plan ID: [id number]            Participants as of Finalize on 7/18/2025    Name Type Comments Contact Info    Jordan Dawson MD Referring Provider  [phone]    Padmini Bowling PT Physical Therapist  [phone]       Last Plan Note     Author: Padmini Bowling PT Status: Signed Last edited: 7/17/2025  9:45 AM       Patient Name Edelmira Montesinos  MRN: [id number]  Today's Date: 07/17/25  Time  "Calculation  Start Time: 0945  Stop Time: 1030  Time Calculation (min): 45 min    Insurance:   Visit #: 1  Insurance Reviewed  (per information provided by  pre-cert team)   Authorization required:  N  Approved # of visits: MN  Authorization/MCR certification date range: 7/17/2025 to 10/15/2025    Therapy Diagnoses:   Problem List Items Addressed This Visit           ICD-10-CM    Acute pain of left knee - Primary M25.562    Relevant Orders    Follow Up In Physical Therapy    Knee stiffness, left M25.662    Relevant Orders    Follow Up In Physical Therapy     General:  Reason for visit: S/P left TKA   Referred by: Dr Eunice Shen MD appt:  7/24/2025  Preferred Name:  Edelmira  Script:  PT eval and treat  Onset Date:  6/11/2025    Subjective:    HPI: Pt underwent left TKA on 6/11/2025. Pt had right TKA on 6/13/2024    Pain      Type of pain:  5/10 Left knee: random shooting pain left medial knee to incision. Also, intermittent \"biting\" pain of left medial and proximal tibia.   What increases pain: pain can happen at anytime  What decreases pain:  ice, Tylenol    Medical Hx/  Heart disease (fast heartbeat), HTN  Precautions:       Adult Screening Risk:  no falls within last year    Fall Risk:  low with FWW    Patient goal:  To walk normal  Patient is aware of diagnosis and prognosis.    Living Environment:    Social Support:  lives with:  spouse and adult son  DME: tub bench/toilet arm risers    Prior Function:   Pt was functioning \"normally\", using a cane when going out, prior to surgery    Function:    A and O x 3  Sleep:  disturbed sleep  ADL's: independent, including her meals  Chores: light chores, (dishes, folding clothes)  Driving: not driving yet  Work: retired  Recreation: unable  Sitting: stiff upon standing  Standing: increased pain within a few minutes    Walking: limited to shorter distances    Objective:    Outcome Measures:  Other Measures  Lower Extremity Funtional Score (LEFS): 19     Posture:  " "decreased WB LE,  LE ER at hip.      Gait/stairs:  With FWW ambulates with steady angelo. Pt demonstrates ability to go up and down 4 steps in alternating fashion with 2 handles    Balance:   L SLS:  0    ROM:  L knee ext/flex:  A: 5-100 AA 2-103    MMT:   R  L  Hip   flex:  5  4+   ext: deferred  abd:  5  4  add: deferred  Quads:  5  4  Hams:  5  4  DF:   5  5  PF:  5  5    Flexibility:  :  Hip flexors:  mild tightness bladimir    Palpation:  NT    Assessment:    Patient is s/p left TKA on 6/11/2025. Pt is doing well for timeframe with left knee active knee ext and flexion and with going up and down steps. Pt  needs work on ROM, flexibility, posture, closed chain, strength, balance, gait and stairs for improved overall function.    Problems:    Pain:  _x__  Posture/Body mechanics deficits:  __x_  Decreased knowledge HEP:  _x__  Decreased knowledge of Precautions:  _x__  Activity Limitations:   _x__  ADL's/IADL's/Self-care skills:  __x_  ROM/Joint Mobility:  __x_  Strength:  __x_  Decreased functional level:   _x__  Flexibility:  __x_  Tenderness/decreased soft tissue mobility:  __x_  Gait/Stairs:  _x__  Fall Risk:  _x__  Balance:  x___  Edema:  _x__  Participation restrictions:  ___  Sensory:  ___  Transfers:  ___  Decreased knowledge of brace:   ___  Other:  ___    X Indicates included in problem list    Goals:      STG:  In two weeks.   Increased postural awareness.     Decreased pain by 2 points to allow patient to progress to SPC    Compliant with HEP    LTG: by discharge    Increased postural awareness and posture WFL.    Increased function with ADL's and IADL's.  Improve LEFS to:  20 %  limitation of function.    Normal gait pattern on level and uneven surfaces community level distances with least device for improved function in the community.     Normal reciprocal pattern on stairs for improved function to all levels of home.      Increase  L SLS to 15\"    Increase L knee AROM to 0/120 for improved function with " car transfers.     Increase L strength to WNL for improved function with chores.    Increase L LE flexibility to WFL.      I and compliant with HEP and proper: L LE care.      Rehab potential to achieve the above goals is good.    Patient is aware of diagnosis and prognosis and agrees with established plan of care and goals.    Plan:   Skilled PT 2 x/week for 4-6 weeks (Until goals achieved, maximum rehab potential has been achieved, or patient has plateaued)  for:    Aquatics  _____  CP   _x___  Dry needling  ____  Education  __x__  Electrical Stimulation  ____  Gait training  __x__  HEP  __x__  HP  ____  Manual  __x__  Mechanical Traction  ____  NMR  __x__  Self-care/home management  _x___  Therapeutic Exercise   _x___  Therapeutic Activities  _x___  US  ____  Work Conditioning  ____  Re-assessment  ____  Other  ____    X Indicates included in treatment plan    PT for Nu-step for functional mobility and soft tissue warm up for more efficient stretching, work on quad stab through progressive resistance of SLR/SAQ, and adding TKE with bands.     Treatment:    Evaluation:  35 minutes    Therapeutic Exercise:  10  No formal ex program given. Verbally reviewed what patient is currently doing at home which includes supine and standing ex for left knee mobility, and LE strengthening. Discussed precautions with ex and also demonstrated good way to place pillow under lower leg without increased left knee flexion    Education:  poc, anatomy, physiology, posture, body mechanics, safety awareness, HEP.  Preferred learning:  pictures, demonstration.  Demonstrated good understanding.                                               Current Participants as of 7/18/2025    Name Type Comments Contact Info    Jordan Dawson MD Referring Provider  [phone]    Signature pending    Padmini Bowling, PT Physical Therapist  [phone]    Signature pending

## 2025-07-17 NOTE — PROGRESS NOTES
"Patient Name Edelmira Montesinos  MRN: [id number]  Today's Date: 07/17/25  Time Calculation  Start Time: 0945  Stop Time: 1030  Time Calculation (min): 45 min    Insurance:   Visit #: 1  Insurance Reviewed  (per information provided by  pre-cert team)   Authorization required:  N  Approved # of visits: MN  Authorization/MCR certification date range: 7/17/2025 to 10/15/2025    Therapy Diagnoses:   Problem List Items Addressed This Visit           ICD-10-CM    Acute pain of left knee - Primary M25.562    Relevant Orders    Follow Up In Physical Therapy    Knee stiffness, left M25.662    Relevant Orders    Follow Up In Physical Therapy     General:  Reason for visit: S/P left TKA   Referred by: Dr Eunice Shen MD appt:  7/24/2025  Preferred Name:  Edelmira  Script:  PT eval and treat  Onset Date:  6/11/2025    Subjective:    HPI: Pt underwent left TKA on 6/11/2025. Pt had right TKA on 6/13/2024    Pain      Type of pain:  5/10 Left knee: random shooting pain left medial knee to incision. Also, intermittent \"biting\" pain of left medial and proximal tibia.   What increases pain: pain can happen at anytime  What decreases pain:  ice, Tylenol    Medical Hx/  Heart disease (fast heartbeat), HTN  Precautions:       Adult Screening Risk:  no falls within last year    Fall Risk:  low with FWW    Patient goal:  To walk normal  Patient is aware of diagnosis and prognosis.    Living Environment:    Social Support:  lives with:  spouse and adult son  DME: tub bench/toilet arm risers    Prior Function:   Pt was functioning \"normally\", using a cane when going out, prior to surgery    Function:    A and O x 3  Sleep:  disturbed sleep  ADL's: independent, including her meals  Chores: light chores, (dishes, folding clothes)  Driving: not driving yet  Work: retired  Recreation: unable  Sitting: stiff upon standing  Standing: increased pain within a few minutes    Walking: limited to shorter distances    Objective:    Outcome " Measures:  Other Measures  Lower Extremity Funtional Score (LEFS): 19     Posture:  decreased WB LE,  LE ER at hip.      Gait/stairs:  With FWW ambulates with steady angelo. Pt demonstrates ability to go up and down 4 steps in alternating fashion with 2 handles    Balance:   L SLS:  0    ROM:  L knee ext/flex:  A: 5-100 AA 2-103    MMT:   R  L  Hip   flex:  5  4+   ext: deferred  abd:  5  4  add: deferred  Quads:  5  4  Hams:  5  4  DF:   5  5  PF:  5  5    Flexibility:  :  Hip flexors:  mild tightness bladimir    Palpation:  NT    Assessment:    Patient is s/p left TKA on 6/11/2025. Pt is doing well for timeframe with left knee active knee ext and flexion and with going up and down steps. Pt  needs work on ROM, flexibility, posture, closed chain, strength, balance, gait and stairs for improved overall function.    Problems:    Pain:  _x__  Posture/Body mechanics deficits:  __x_  Decreased knowledge HEP:  _x__  Decreased knowledge of Precautions:  _x__  Activity Limitations:   _x__  ADL's/IADL's/Self-care skills:  __x_  ROM/Joint Mobility:  __x_  Strength:  __x_  Decreased functional level:   _x__  Flexibility:  __x_  Tenderness/decreased soft tissue mobility:  __x_  Gait/Stairs:  _x__  Fall Risk:  _x__  Balance:  x___  Edema:  _x__  Participation restrictions:  ___  Sensory:  ___  Transfers:  ___  Decreased knowledge of brace:   ___  Other:  ___    X Indicates included in problem list    Goals:      STG:  In two weeks.   Increased postural awareness.     Decreased pain by 2 points to allow patient to progress to SPC    Compliant with HEP    LTG: by discharge    Increased postural awareness and posture WFL.    Increased function with ADL's and IADL's.  Improve LEFS to:  20 %  limitation of function.    Normal gait pattern on level and uneven surfaces community level distances with least device for improved function in the community.     Normal reciprocal pattern on stairs for improved function to all levels of home.   "    Increase  L SLS to 15\"    Increase L knee AROM to 0/120 for improved function with car transfers.     Increase L strength to WNL for improved function with chores.    Increase L LE flexibility to WFL.      I and compliant with HEP and proper: L LE care.      Rehab potential to achieve the above goals is good.    Patient is aware of diagnosis and prognosis and agrees with established plan of care and goals.    Plan:   Skilled PT 2 x/week for 4-6 weeks (Until goals achieved, maximum rehab potential has been achieved, or patient has plateaued)  for:    Aquatics  _____  CP   _x___  Dry needling  ____  Education  __x__  Electrical Stimulation  ____  Gait training  __x__  HEP  __x__  HP  ____  Manual  __x__  Mechanical Traction  ____  NMR  __x__  Self-care/home management  _x___  Therapeutic Exercise   _x___  Therapeutic Activities  _x___  US  ____  Work Conditioning  ____  Re-assessment  ____  Other  ____    X Indicates included in treatment plan    PT for Nu-step for functional mobility and soft tissue warm up for more efficient stretching, work on quad stab through progressive resistance of SLR/SAQ, and adding TKE with bands.     Treatment:    Evaluation:  35 minutes    Therapeutic Exercise:  10  No formal ex program given. Verbally reviewed what patient is currently doing at home which includes supine and standing ex for left knee mobility, and LE strengthening. Discussed precautions with ex and also demonstrated good way to place pillow under lower leg without increased left knee flexion    Education:  poc, anatomy, physiology, posture, body mechanics, safety awareness, HEP.  Preferred learning:  pictures, demonstration.  Demonstrated good understanding.                                        "

## 2025-07-22 ENCOUNTER — TREATMENT (OUTPATIENT)
Dept: PHYSICAL THERAPY | Facility: CLINIC | Age: 80
End: 2025-07-22
Payer: MEDICARE

## 2025-07-22 DIAGNOSIS — M25.662 KNEE STIFFNESS, LEFT: ICD-10-CM

## 2025-07-22 DIAGNOSIS — M25.562 ACUTE PAIN OF LEFT KNEE: Primary | ICD-10-CM

## 2025-07-22 PROCEDURE — 97110 THERAPEUTIC EXERCISES: CPT | Mod: GP,CQ

## 2025-07-22 NOTE — PROGRESS NOTES
"Physical Therapy  Physical Therapy Progress Note    Patient Name Edelmira Montesinos   MRN: [id number]  Today's Date: 7/22/2025  Time Calculation  Start Time: 0830  Stop Time: 0925  Time Calculation (min): 55 min    Insurance:    (per information provided by  pre-cert team)  Visit #2  Insurance Reviewed  (per information provided by  pre-cert team)   Authorization required:  N  Approved # of visits: MN  Authorization/MCR certification date range: 7/17/2025 to 10/15/2025    Therapy Diagnoses:   1. Acute pain of left knee  Follow Up In Physical Therapy      2. Knee stiffness, left  Follow Up In Physical Therapy          Date of Last Surgery: No surgery found  Procedure: No surgery found  Post Op Days: No surgery found     General:  Reason for visit: S/P left TKA   Referred by: Dr Eunice Shen MD appt:  7/24/2025  Preferred Name:  Edelmira  Script:  PT eval and treat  Onset Date:  6/11/2025  Assessment/re-assessment dates:       Subjective:   Patient reports: she is doing ok. Doing ehr HEP and it is helping with her knee mobility.     Have you fallen since last visit:  no    Medical Hx/  Heart disease (fast heartbeat), HTN  Precautions:    Type of pain:  5/10 Left knee: random shooting pain left medial knee to incision. Also, intermittent \"biting\" pain of left medial and proximal tibia.   What increases pain: pain can happen at anytime  What decreases pain:  ice, Tylenol    HEP compliance/understanding:  yes, doing 3 x per day. In the evening does less reps.     Objective:   Objective Measurements:    Function:   Started walking with her cane around the house.  Posture: decreased WB LE, LE ER at hip.   Gait: Ambulates with FWW with slow angelo, step through gait, with increased LLE heel strike and toe off with verbla cueing, with decreased LLE stance time and decreased L knee extension during stance phase. 0.............  ROM:  Supine LLE AAROM 5 - 105      Treatment:   **= HEP progression today NV= Next visit  np= " "not performed  nb= non-billable  G= group HEP= discharged to HEP      Therapeutic Exercise:     45 minutes  NuStep L3 10 minutes  w/ Subjective taken  Stair Stretches: Hamstrings/hip flexors/calves 30\" x 3  March in // bars x 10  3 way hip AROM in // bars x 10 each bladimir  QS w/ towel under knee and  ankle LLE 5\" x 10  SLR x 10                                       SAQ x 10  AA Heel slides w/ board/belt 10\" x 10  LAQ x 10  TKE red tband 5\" x 10 tc's to avoid compensation       Modalities:       10 minutes  Vasopneumatic Device       minutes  Electrical Stimulation          minutes  Ultrasound            minutes  Iontophoresis                     minutes  Cold Pack            Seated  LLE CP 10 minutes  Mechanical Traction           minutes        Education:  exercise progression, quad stabilization, avoidance of compensation    Assessment:  Patient tolerated session well, did well with standing exercise progression, with exercises provided for HEP this visit. Improved left knee AAROM this visit.   Patient needs continued work on/skilled PT for: left knee mobility and strengthening to address remaining functional, objective and subjective deficits to allow them to return to prior /optimal level of function with ADLs.  Patient is progressing with goals: strength, mobility   Skilled care:  therapeutic exercise, patient education, CP    Plan:    Continue to progress per poc:   NV add partial squats    HEP:    Access Code: [email or web address]  URL: [email or web address]  Date: 07/22/2025  Prepared by: Laurel Lou    Exercises  - Standing March with Counter Support  - 1 x daily - 7 x weekly - 1 sets - 10 reps  - Standing Hip Abduction with Counter Support  - 1 x daily - 7 x weekly - 1 sets - 10 reps  - Standing Hip Abduction with Counter Support  - 1 x daily - 7 x weekly - 1 sets - 10 reps  - Standing Hip Extension with Counter Support  - 1 x daily - 7 x weekly - 1 sets - 10 reps  - Standing Hip Extension with " Counter Support  - 1 x daily - 7 x weekly - 1 sets - 10 reps  - Standing Hip Extension with Counter Support  - 1 x daily - 7 x weekly - 1 sets - 10 reps  - Standing Hip Extension with Counter Support  - 1 x daily - 7 x weekly - 1 sets - 10 reps

## 2025-07-28 ENCOUNTER — TREATMENT (OUTPATIENT)
Dept: PHYSICAL THERAPY | Facility: CLINIC | Age: 80
End: 2025-07-28
Payer: MEDICARE

## 2025-07-28 DIAGNOSIS — M25.662 KNEE STIFFNESS, LEFT: ICD-10-CM

## 2025-07-28 DIAGNOSIS — M25.562 ACUTE PAIN OF LEFT KNEE: Primary | ICD-10-CM

## 2025-07-28 PROCEDURE — 97110 THERAPEUTIC EXERCISES: CPT | Mod: GP,CQ

## 2025-07-28 NOTE — PROGRESS NOTES
"Physical Therapy  Physical Therapy Progress Note    Patient Name Edelmira Montesinos   MRN: [id number]  Today's Date: 7/28/2025  Time Calculation  Start Time: 1020  Stop Time: 1115  Time Calculation (min): 55 min    Insurance:    Visit #3  Insurance Reviewed  (per information provided by  pre-cert team)   Authorization required:  N  Approved # of visits: MN  Authorization/MCR certification date range: 7/17/2025 to 10/15/2025    Therapy Diagnoses:   1. Acute pain of left knee  Follow Up In Physical Therapy      2. Knee stiffness, left  Follow Up In Physical Therapy        Date of Last Surgery: 6/11/25  Procedure: L. TKR    General:  Reason for visit: S/P left TKA   Referred by: Dr Eunice Shen MD appt:  7/24/2025  Preferred Name:  Edelmira  Script:  PT eval and treat  Onset Date:  6/11/2025  Assessment/re-assessment dates:       Subjective:   Patient reports:  she can only handle doing her heel slides twice a day.  Is icing regularly, with worse pain after icing.  States she is using the cane at home, only ww for longer, community distances. Patient would like to go down her stairs, but has not attempted yet.  Advised patient to not attempt stairs until practiced in PT.     Have you fallen since last visit:  no    Medical Hx/  Heart disease (fast heartbeat), HTN  Precautions:    Pain:  5/10  Location/type:  L. Medial knee/tender,sore  HEP compliance/understanding:  yes - reviewed    Objective:   Objective Measurements:    AROM:  Supine L. LE 5 - 101  PROM:  Supine L. LE (3-105), post heel slides      Treatment:   **= HEP progression today NV= Next visit  np= not performed  nb= non-billable  G= group HEP= discharged to HEP    Therapeutic Exercise:     45 minutes  NuStep L3 10 minutes  w/ Subjective taken, seat 8  Stair Stretches: Hamstrings/hip flexors/calves 30\" x 3  L. Knee flexion stair stretch 10\" hold x 10  March in // bars 2 x 10  3 way hip AROM in // bars 2 x 10 each bladimir  QS w/ towel under knee and  ankle LLE 5\" " "2 x 10  SLR 2 x 10                                       SAQ 2 x 10  AA Heel slides w/ board/belt 10\" x 10  LAQ 2 x 10  Step ups fwd 4\" x 10  Step up lateral 4\" x 10     Not Performed:  TKE red tband 5\" x 10 tc's to avoid compensation    Modalities:       10 minutes  Cold Pack  10            Seated applied to L. knee    Education:  V/c for correct technique and posture with exercises.      Assessment:  Progressed patient with addition of step ups in both forward and lateral directions.  Performed slowly and with control.  Able to increase reps with LE strengthening ex.  Fatigued at end of session. Relief with CP.   Patient needs continued work on/skilled PT for: left knee mobility and strengthening to address remaining functional, objective and subjective deficits to allow them to return to prior /optimal level of function with ADLs.  Patient is progressing with goals: strength, mobility   Skilled care:  therapeutic exercise, patient education    Plan:    Continue to progress per poc:   Increase step up reps.  Add step downs as tolerated.       HEP:    Access Code: [email or web address]  URL: [email or web address]  Date: 07/22/2025  Prepared by: Laurel Lou    Exercises  - Standing March with Counter Support  - 1 x daily - 7 x weekly - 1 sets - 10 reps  - Standing Hip Abduction with Counter Support  - 1 x daily - 7 x weekly - 1 sets - 10 reps  - Standing Hip Abduction with Counter Support  - 1 x daily - 7 x weekly - 1 sets - 10 reps  - Standing Hip Extension with Counter Support  - 1 x daily - 7 x weekly - 1 sets - 10 reps  - Standing Hip Extension with Counter Support  - 1 x daily - 7 x weekly - 1 sets - 10 reps  - Standing Hip Extension with Counter Support  - 1 x daily - 7 x weekly - 1 sets - 10 reps  - Standing Hip Extension with Counter Support  - 1 x daily - 7 x weekly - 1 sets - 10 reps    "

## 2025-07-31 ENCOUNTER — TREATMENT (OUTPATIENT)
Dept: PHYSICAL THERAPY | Facility: CLINIC | Age: 80
End: 2025-07-31
Payer: MEDICARE

## 2025-07-31 DIAGNOSIS — M25.562 ACUTE PAIN OF LEFT KNEE: Primary | ICD-10-CM

## 2025-07-31 DIAGNOSIS — M25.662 KNEE STIFFNESS, LEFT: ICD-10-CM

## 2025-07-31 PROCEDURE — 97110 THERAPEUTIC EXERCISES: CPT | Mod: GP

## 2025-07-31 PROCEDURE — 97140 MANUAL THERAPY 1/> REGIONS: CPT | Mod: GP

## 2025-07-31 NOTE — PROGRESS NOTES
"Physical Therapy  Physical Therapy Progress Note    Patient Name Edelmira Montesinos   MRN: [id number]  Today's Date: 7/31/2025  Time Calculation  Start Time: 1118  Stop Time: 1156  Time Calculation (min): 38 min    Insurance:    Visit #:   4   Insurance Reviewed  (per information provided by  pre-cert team)   Authorization required:  N  Approved # of visits: MN  Authorization/MCR certification date range: 7/17/2025 to 10/15/2025    Therapy Diagnoses:   Problem List Items Addressed This Visit           ICD-10-CM    Acute pain of left knee - Primary M25.562    Knee stiffness, left M25.662     General:  Reason for visit: S/P left TKA   Referred by: Dr Eunice Shen MD appt:  7/24/2025  Preferred Name:  Edelmira  Script:  PT eval and treat  Onset Date:  6/11/2025  Assessment/re-assessment dates:     Subjective:   Patient reports:  she has more pain after icing her knee than if she doesn't ice    Have you fallen since last visit:  no    Precautions:  TKA precautions    Pain:  3-4 to proximal medial lower leg when it hurts, /10  Location/Type of pain:  medial aspect of proximal lower leg    HEP compliance/understanding:  good    Objective:   Objective Measurements:    Function:     Posture:   Gait:  Balance:   ROM:  2 deg from full ext, 110 AROM flexion, 115 with belt  Strength:  Flexibility:      Treatment:   **= HEP progression today NV= Next visit  np= not performed  nb= non-billable  G= group HEP= discharged to HEP  Therapeutic Exercise:     23 minutes  NuStep L3 10 minutes  w/ Subjective taken, seat 8  Stair Stretches: Hamstrings/hip flexors/calves 30\" x 3  QS with tactile cueing for more emphasis on pushing the back of the knee down  AA Heel slides w/ board/belt 10\" x 10  LAQ 2 x 10      NP  March in // bars 2 x 10  3 way hip AROM in // bars 2 x 10 each bladimir  QS w/ towel under knee and  ankle LLE 5\" 2 x 10  SLR 2 x 10                                       SAQ 2 x 10  Step ups fwd 4\" x 10  Step up lateral 4\" x " "10    Manual Therapy:     15 minutes  STM to left knee, especially posterior knee/HS tendons, and distal quads  Patellar and scar mobs  Tibial ext mobs, grade II  Passive left knee flexion with end range stretching, 20\" x 10  Passive stretching in terminal range of left knee for work towards 0 deg.    Education:  reviewing importance of regular knee stretching which patient states she is doing     Assessment:  Patient tolerated treatment well, making gains with mobility after manual treatment  Patient needs continued work on/skilled PT for: continued progression towards functional AROM, as well as progressing CKC ex to address remaining functional, objective and subjective deficits to allow them to return to prior /optimal level of function with ADLs.  Patient is progressing with goals: yes  Skilled care:  manual    Plan:    Continue to progress per poc:   NV add standing TKEs    HEP:  Access Code: [email or web address]  URL: [email or web address]  Date: 07/22/2025  Prepared by: Laurel Lou    Exercises  - Standing March with Counter Support  - 1 x daily - 7 x weekly - 1 sets - 10 reps  - Standing Hip Abduction with Counter Support  - 1 x daily - 7 x weekly - 1 sets - 10 reps  - Standing Hip Abduction with Counter Support  - 1 x daily - 7 x weekly - 1 sets - 10 reps  - Standing Hip Extension with Counter Support  - 1 x daily - 7 x weekly - 1 sets - 10 reps  - Standing Hip Extension with Counter Support  - 1 x daily - 7 x weekly - 1 sets - 10 reps  - Standing Hip Extension with Counter Support  - 1 x daily - 7 x weekly - 1 sets - 10 reps  - Standing Hip Extension with Counter Support  - 1 x daily - 7 x weekly - 1 sets - 10 reps  "

## 2025-08-04 ENCOUNTER — TELEPHONE (OUTPATIENT)
Dept: CARDIOLOGY | Facility: CLINIC | Age: 80
End: 2025-08-04
Payer: MEDICARE

## 2025-08-05 ENCOUNTER — TREATMENT (OUTPATIENT)
Dept: PHYSICAL THERAPY | Facility: CLINIC | Age: 80
End: 2025-08-05
Payer: MEDICARE

## 2025-08-05 DIAGNOSIS — M25.662 KNEE STIFFNESS, LEFT: ICD-10-CM

## 2025-08-05 DIAGNOSIS — M25.562 ACUTE PAIN OF LEFT KNEE: Primary | ICD-10-CM

## 2025-08-05 PROBLEM — N95.0 POST-MENOPAUSAL BLEEDING: Status: RESOLVED | Noted: 2023-11-21 | Resolved: 2025-08-05

## 2025-08-05 PROBLEM — N39.0 ACUTE URINARY TRACT INFECTION: Status: RESOLVED | Noted: 2024-06-26 | Resolved: 2025-08-05

## 2025-08-05 PROBLEM — R26.89 IMPAIRMENT OF BALANCE: Status: ACTIVE | Noted: 2025-08-05

## 2025-08-05 PROBLEM — R82.90 ABNORMAL URINALYSIS: Status: RESOLVED | Noted: 2023-11-21 | Resolved: 2025-08-05

## 2025-08-05 PROBLEM — J20.9 ACUTE BRONCHITIS: Status: RESOLVED | Noted: 2023-12-03 | Resolved: 2025-08-05

## 2025-08-05 PROBLEM — N39.3 FEMALE STRESS INCONTINENCE: Status: ACTIVE | Noted: 2025-08-05

## 2025-08-05 PROBLEM — R07.0 THROAT DISCOMFORT: Status: RESOLVED | Noted: 2023-11-21 | Resolved: 2025-08-05

## 2025-08-05 PROBLEM — K11.7 DISTURBANCE OF SALIVARY SECRETION: Status: RESOLVED | Noted: 2023-11-21 | Resolved: 2025-08-05

## 2025-08-05 PROBLEM — R10.2 PELVIC PAIN IN FEMALE: Status: RESOLVED | Noted: 2023-11-21 | Resolved: 2025-08-05

## 2025-08-05 PROBLEM — Z90.710 S/P VAGINAL HYSTERECTOMY: Status: RESOLVED | Noted: 2023-11-27 | Resolved: 2025-08-05

## 2025-08-05 PROBLEM — M70.52 PES ANSERINUS BURSITIS OF LEFT KNEE: Status: ACTIVE | Noted: 2024-11-18

## 2025-08-05 PROBLEM — L30.9 DERMATITIS: Status: RESOLVED | Noted: 2023-11-21 | Resolved: 2025-08-05

## 2025-08-05 PROCEDURE — 97140 MANUAL THERAPY 1/> REGIONS: CPT | Mod: GP

## 2025-08-05 PROCEDURE — 97110 THERAPEUTIC EXERCISES: CPT | Mod: GP

## 2025-08-05 NOTE — PROGRESS NOTES
"Physical Therapy  Physical Therapy Progress Note    Patient Name Edelmira Montesinos   MRN: [id number]  Today's Date: 8/5/2025  Time Calculation  Start Time: 1045  Stop Time: 1130  Time Calculation (min): 45 min    Insurance:    Visit #:   5   Insurance Reviewed  (per information provided by  pre-cert team)   Authorization required:  N  Approved # of visits: MN  Authorization/MCR certification date range: 7/17/2025 to 10/15/2025    Therapy Diagnoses:   Problem List Items Addressed This Visit           ICD-10-CM    RESOLVED: Acute pain of left knee - Primary M25.562    Knee stiffness, left M25.662     General:  Reason for visit: S/P left TKA   Referred by: Dr Eunice Shen MD appt:  7/24/2025  Preferred Name:  Edelmira  Script:  PT eval and treat  Onset Date:  6/11/2025  Assessment/re-assessment dates:     Subjective:   Patient reports:  she is using the cane at home, and is also tries to go without cane.     Have you fallen since last visit:  no    Precautions:  TKA    Pain:  0/10  Location/Type of pain:  left medial knee and proximal medial lower leg    HEP compliance/understanding:  good    Objective:   Objective Measurements:    Function:     Posture:   Gait: went up 4 steps with cane and rail in alternating fashion, going down she did alternating as well.  Balance:   ROM:  PROM left knee ext 0; AAROM left knee flexion with belt 110; AROM 107  Strength:  Flexibility:      Treatment:   **= HEP progression today NV= Next visit  np= not performed  nb= non-billable  G= group HEP= discharged to [facility]  Therapeutic Exercise:     30 minutes  NuStep L3 8 minutes  w/ Subjective taken, seat 6  Stair Stretches: Hamstrings/hip flexors/calves 30\" x 3  6\" left forward step up and back down, 10x  6\" left lateral step up and back down, 10x  Airex heel raises, 2 x 10  Airex, forward step lunge onto left then rock back, 2 x 10  AA Heel slides w/ board/belt 10\" x 10    QS with tactile cueing for more emphasis on pushing the back of the " "knee down  LAQ 2 x 10      NP  March in // bars 2 x 10  3 way hip AROM in // bars 2 x 10 each bladimir  QS w/ towel under knee and  ankle LLE 5\" 2 x 10  SLR 2 x 10                                       SAQ 2 x 10    Manual Therapy:     10 minutes  STM to left knee, especially posterior knee/HS tendons, and distal quads  Patellar and scar mobs  Passive left knee ext and flexion with end range stretching, 20\" x 10    Gait Trainin minutes  Observe patient correctly using straight cane about the clinic  Supervised stair climbing with straight cane and rail to ensure safe practice.     Education:  new ex form and instruction; stair climbing review    Assessment:  Patient tolerated treatment well, progressing with gait and CKC ex. Pt having increased stiffness of left knee today making it difficult to gain from last visit  Patient needs continued work on/skilled PT for: continued efforts to improve left knee AROM and functional strengthening to address remaining functional, objective and subjective deficits to allow them to return to prior /optimal level of function with ADLs.  Patient is progressing with goals: yes  Skilled care:  manual, ex guidance    Plan:    Continue to progress per poc:   NV add seated knee flexion stretch    HEP:   Access Code: [email or web address]  URL: [email or web address]  Date: 2025  Prepared by: Laurel Lou    Exercises  - Standing March with Counter Support  - 1 x daily - 7 x weekly - 1 sets - 10 reps  - Standing Hip Abduction with Counter Support  - 1 x daily - 7 x weekly - 1 sets - 10 reps  - Standing Hip Abduction with Counter Support  - 1 x daily - 7 x weekly - 1 sets - 10 reps  - Standing Hip Extension with Counter Support  - 1 x daily - 7 x weekly - 1 sets - 10 reps  - Standing Hip Extension with Counter Support  - 1 x daily - 7 x weekly - 1 sets - 10 reps  - Standing Hip Extension with Counter Support  - 1 x daily - 7 x weekly - 1 sets - 10 reps  - Standing " Hip Extension with Counter Support  - 1 x daily - 7 x weekly - 1 sets - 10 reps

## 2025-08-07 ENCOUNTER — TREATMENT (OUTPATIENT)
Dept: PHYSICAL THERAPY | Facility: CLINIC | Age: 80
End: 2025-08-07
Payer: MEDICARE

## 2025-08-07 DIAGNOSIS — M25.562 ACUTE PAIN OF LEFT KNEE: Primary | ICD-10-CM

## 2025-08-07 DIAGNOSIS — M25.662 KNEE STIFFNESS, LEFT: ICD-10-CM

## 2025-08-07 PROCEDURE — 97110 THERAPEUTIC EXERCISES: CPT | Mod: GP,CQ

## 2025-08-07 PROCEDURE — 97140 MANUAL THERAPY 1/> REGIONS: CPT | Mod: GP,CQ

## 2025-08-07 NOTE — PROGRESS NOTES
"Physical Therapy Treatment    Patient Name: Edelmira Montesinos  MRN: [id number]  YOB: 1945  Encounter Date: 8/7/2025    Time Entry:  Time Calculation  Start Time: 0920  Stop Time: 1010  Time Calculation (min): 50 min     PT Therapeutic Procedures Time Entry  Therapeutic Exercise Time Entry: 25  Neuromuscular Re-Education Time Entry: 5  Manual Therapy Time Entry: 10                   Rehab Insurance Information:   Visit Count: 7  Auth Required: No  Medicare cert. dates: 07/17/25  Through: 09/15/25          Rehab Falls Risk Assessment:  Fall Risk Indicated: Yes (Low with FWW)      Problem List Items Addressed This Visit           ICD-10-CM    RESOLVED: Acute pain of left knee - Primary M25.562    Knee stiffness, left M25.662       Precautions       Additional Precautions and Protocol Details:  TKA    Subjective   Patient complains of left leg nerve pain running from knee down to ankle..  Pain reported as 3.           Objective          Knee Range of Motion   Left Knee   Active (deg) Passive (deg) Pain   Flexion 107 110     Extension 1 0                   Activities   Therapeutic Exercise  Therapeutic Exercise Performed: Yes (**= HEP progression today NV= Next visit np= not performed nb= non-billable G= group HEP= discharged to [facility])  Therapeutic Exercise Activity 1: Access Code: [email or web address]  Therapeutic Exercise Activity 2: Stair Stretches: Hamstrings/hip flexors/calves 30\" x 3  Therapeutic Exercise Activity 3: 6\" left forward step up and back down, 10x  Therapeutic Exercise Activity 4: 6\" left lateral step up and back down, 10x  Therapeutic Exercise Activity 5: AA Heel slides w/ board/belt 10\" x 10  Therapeutic Exercise Activity 6: QS with tactile cueing for more emphasis on pushing the back of the knee down  Therapeutic Exercise Activity 7: LAQ 2 x 10  Therapeutic Exercise Activity 8: NuStep L3 10 minutes w/ Subjective taken, seat 6           Balance/Neuromuscular Re-Education  Balance/Neuromuscular " "Re-Education Activity Performed: Yes  Balance/Neuromuscular Re-Education Activity 1: Airex march in // barsx2  x 10  Balance/Neuromuscular Re-Education Activity 2: Airex heel raises,in // bars  2 x 10   Manual Therapy  Manual Therapy Performed: Yes  Manual Therapy Activity 1: STM to left knee, especially posterior knee/HS tendons, and distal quads  Manual Therapy Activity 2: Patellar and scar mobs  Manual Therapy Activity 3: Passive left knee ext and flexion with end range stretching, 20\" x 10   Modalities  Modalities Used: Yes (Seated left knee cP 10 minutes)  Modality 1: Untimed Cold Pack                              Other Activity  Other Activity Performed: No    Education  Education was done with Patient. The patient's learning style includes Demonstration. The patient Demonstrates understanding and Verbalizes understanding.         Quad stabilization, eccentric control, L knee sensory desensitization with textures, home scar massage       Assessment/Plan   Assessment: Requried tactiel cueing for left quad stabilization with step ups this visit. Patient needs continued work on/skilled PT for: continued efforts to improve left knee AROM and functional strengthening to address remaining functional, objective and subjective deficits to allow them to return to prior /optimal level of function with ADLs.  Patient is progressing with goals: yes  Skilled care:  manual, ex guidance Patient tolerated treatment well      Plan: Continue to progress per poc:   NV add seated knee flexion stretch  "

## 2025-08-12 ENCOUNTER — TREATMENT (OUTPATIENT)
Dept: PHYSICAL THERAPY | Facility: CLINIC | Age: 80
End: 2025-08-12
Payer: MEDICARE

## 2025-08-12 DIAGNOSIS — M25.562 ACUTE PAIN OF LEFT KNEE: Primary | ICD-10-CM

## 2025-08-12 DIAGNOSIS — M25.662 KNEE STIFFNESS, LEFT: ICD-10-CM

## 2025-08-12 PROCEDURE — 97140 MANUAL THERAPY 1/> REGIONS: CPT | Mod: GP

## 2025-08-12 PROCEDURE — 97110 THERAPEUTIC EXERCISES: CPT | Mod: GP

## 2025-08-14 ENCOUNTER — APPOINTMENT (OUTPATIENT)
Dept: PHYSICAL THERAPY | Facility: CLINIC | Age: 80
End: 2025-08-14
Payer: MEDICARE

## 2025-08-14 DIAGNOSIS — M25.662 KNEE STIFFNESS, LEFT: ICD-10-CM

## 2025-08-14 DIAGNOSIS — M25.562 ACUTE PAIN OF LEFT KNEE: Primary | ICD-10-CM

## 2025-08-19 ENCOUNTER — TREATMENT (OUTPATIENT)
Dept: PHYSICAL THERAPY | Facility: CLINIC | Age: 80
End: 2025-08-19
Payer: MEDICARE

## 2025-08-19 DIAGNOSIS — M25.562 ACUTE PAIN OF LEFT KNEE: Primary | ICD-10-CM

## 2025-08-19 DIAGNOSIS — M25.662 KNEE STIFFNESS, LEFT: ICD-10-CM

## 2025-08-19 PROCEDURE — 97110 THERAPEUTIC EXERCISES: CPT | Mod: GP

## 2025-08-19 PROCEDURE — 97140 MANUAL THERAPY 1/> REGIONS: CPT | Mod: GP

## 2025-08-21 ENCOUNTER — TREATMENT (OUTPATIENT)
Dept: PHYSICAL THERAPY | Facility: CLINIC | Age: 80
End: 2025-08-21
Payer: MEDICARE

## 2025-08-21 DIAGNOSIS — M25.562 ACUTE PAIN OF LEFT KNEE: Primary | ICD-10-CM

## 2025-08-21 DIAGNOSIS — M25.662 KNEE STIFFNESS, LEFT: ICD-10-CM

## 2025-08-21 PROCEDURE — 97140 MANUAL THERAPY 1/> REGIONS: CPT | Mod: GP

## 2025-08-21 PROCEDURE — 97110 THERAPEUTIC EXERCISES: CPT | Mod: GP

## 2025-08-25 ENCOUNTER — APPOINTMENT (OUTPATIENT)
Dept: CARDIOLOGY | Facility: CLINIC | Age: 80
End: 2025-08-25
Payer: MEDICARE

## 2025-08-25 VITALS
WEIGHT: 153 LBS | OXYGEN SATURATION: 98 % | DIASTOLIC BLOOD PRESSURE: 76 MMHG | BODY MASS INDEX: 28.16 KG/M2 | HEIGHT: 62 IN | HEART RATE: 80 BPM | SYSTOLIC BLOOD PRESSURE: 134 MMHG

## 2025-08-25 DIAGNOSIS — I10 PRIMARY HYPERTENSION: Primary | ICD-10-CM

## 2025-08-25 DIAGNOSIS — R94.31 ABNORMAL EKG: ICD-10-CM

## 2025-08-25 DIAGNOSIS — I25.118 CORONARY ARTERY DISEASE OF NATIVE ARTERY OF NATIVE HEART WITH STABLE ANGINA PECTORIS: ICD-10-CM

## 2025-08-25 DIAGNOSIS — R06.09 EXERTIONAL DYSPNEA: ICD-10-CM

## 2025-08-25 DIAGNOSIS — R00.2 PALPITATIONS: ICD-10-CM

## 2025-08-25 DIAGNOSIS — E78.49 OTHER HYPERLIPIDEMIA: ICD-10-CM

## 2025-08-25 PROBLEM — I50.32 CHRONIC DIASTOLIC (CONGESTIVE) HEART FAILURE: Status: RESOLVED | Noted: 2024-10-29 | Resolved: 2025-08-25

## 2025-08-25 LAB
ATRIAL RATE: 79 BPM
P AXIS: 49 DEGREES
P OFFSET: 201 MS
P ONSET: 140 MS
PR INTERVAL: 172 MS
Q ONSET: 226 MS
QRS COUNT: 13 BEATS
QRS DURATION: 96 MS
QT INTERVAL: 354 MS
QTC CALCULATION(BAZETT): 405 MS
QTC FREDERICIA: 388 MS
R AXIS: -51 DEGREES
T AXIS: 64 DEGREES
T OFFSET: 403 MS
VENTRICULAR RATE: 79 BPM

## 2025-08-25 PROCEDURE — 3075F SYST BP GE 130 - 139MM HG: CPT | Performed by: INTERNAL MEDICINE

## 2025-08-25 PROCEDURE — 1036F TOBACCO NON-USER: CPT | Performed by: INTERNAL MEDICINE

## 2025-08-25 PROCEDURE — 3078F DIAST BP <80 MM HG: CPT | Performed by: INTERNAL MEDICINE

## 2025-08-25 PROCEDURE — 1159F MED LIST DOCD IN RCRD: CPT | Performed by: INTERNAL MEDICINE

## 2025-08-25 PROCEDURE — 99213 OFFICE O/P EST LOW 20 MIN: CPT | Performed by: INTERNAL MEDICINE

## 2025-08-25 RX ORDER — TRAMADOL HYDROCHLORIDE 50 MG/1
TABLET, COATED ORAL
COMMUNITY
Start: 2025-06-12

## 2025-08-25 RX ORDER — ASPIRIN 81 MG/1
81 TABLET ORAL
COMMUNITY
Start: 2025-06-12

## 2025-08-26 ENCOUNTER — TREATMENT (OUTPATIENT)
Dept: PHYSICAL THERAPY | Facility: CLINIC | Age: 80
End: 2025-08-26
Payer: MEDICARE

## 2025-08-26 DIAGNOSIS — M25.662 KNEE STIFFNESS, LEFT: ICD-10-CM

## 2025-08-26 DIAGNOSIS — M25.562 ACUTE PAIN OF LEFT KNEE: Primary | ICD-10-CM

## 2025-08-26 PROCEDURE — 97110 THERAPEUTIC EXERCISES: CPT | Mod: GP

## 2025-08-26 PROCEDURE — 97140 MANUAL THERAPY 1/> REGIONS: CPT | Mod: GP

## 2025-08-28 ENCOUNTER — APPOINTMENT (OUTPATIENT)
Dept: PHYSICAL THERAPY | Facility: CLINIC | Age: 80
End: 2025-08-28
Payer: MEDICARE

## 2025-08-28 DIAGNOSIS — M25.562 ACUTE PAIN OF LEFT KNEE: Primary | ICD-10-CM

## 2025-08-28 DIAGNOSIS — M25.662 KNEE STIFFNESS, LEFT: ICD-10-CM

## 2025-09-02 ENCOUNTER — TREATMENT (OUTPATIENT)
Dept: PHYSICAL THERAPY | Facility: CLINIC | Age: 80
End: 2025-09-02
Payer: MEDICARE

## 2025-09-02 DIAGNOSIS — M25.662 KNEE STIFFNESS, LEFT: ICD-10-CM

## 2025-09-02 DIAGNOSIS — M25.562 ACUTE PAIN OF LEFT KNEE: Primary | ICD-10-CM

## 2025-09-02 PROCEDURE — 97110 THERAPEUTIC EXERCISES: CPT | Mod: GP

## 2025-09-02 PROCEDURE — 97140 MANUAL THERAPY 1/> REGIONS: CPT | Mod: GP

## 2025-09-04 ENCOUNTER — TREATMENT (OUTPATIENT)
Dept: PHYSICAL THERAPY | Facility: CLINIC | Age: 80
End: 2025-09-04
Payer: MEDICARE

## 2025-09-04 DIAGNOSIS — M25.562 ACUTE PAIN OF LEFT KNEE: Primary | ICD-10-CM

## 2025-09-04 DIAGNOSIS — M25.662 KNEE STIFFNESS, LEFT: ICD-10-CM

## 2025-09-04 PROCEDURE — 97110 THERAPEUTIC EXERCISES: CPT | Mod: GP

## 2025-09-04 PROCEDURE — 97140 MANUAL THERAPY 1/> REGIONS: CPT | Mod: GP

## 2025-09-11 ENCOUNTER — APPOINTMENT (OUTPATIENT)
Dept: PHYSICAL THERAPY | Facility: CLINIC | Age: 80
End: 2025-09-11
Payer: MEDICARE

## 2025-09-11 DIAGNOSIS — M25.662 KNEE STIFFNESS, LEFT: ICD-10-CM

## 2025-09-30 ENCOUNTER — APPOINTMENT (OUTPATIENT)
Dept: CARDIOLOGY | Facility: CLINIC | Age: 80
End: 2025-09-30
Payer: MEDICARE